# Patient Record
Sex: FEMALE | Race: WHITE | NOT HISPANIC OR LATINO
[De-identification: names, ages, dates, MRNs, and addresses within clinical notes are randomized per-mention and may not be internally consistent; named-entity substitution may affect disease eponyms.]

---

## 2017-01-18 ENCOUNTER — RX RENEWAL (OUTPATIENT)
Age: 82
End: 2017-01-18

## 2017-01-23 ENCOUNTER — APPOINTMENT (OUTPATIENT)
Dept: PULMONOLOGY | Facility: CLINIC | Age: 82
End: 2017-01-23

## 2017-01-23 VITALS
SYSTOLIC BLOOD PRESSURE: 140 MMHG | DIASTOLIC BLOOD PRESSURE: 78 MMHG | WEIGHT: 108 LBS | OXYGEN SATURATION: 98 % | RESPIRATION RATE: 14 BRPM | BODY MASS INDEX: 19.75 KG/M2 | HEART RATE: 80 BPM

## 2017-04-11 ENCOUNTER — RX RENEWAL (OUTPATIENT)
Age: 82
End: 2017-04-11

## 2017-04-17 ENCOUNTER — APPOINTMENT (OUTPATIENT)
Dept: PULMONOLOGY | Facility: CLINIC | Age: 82
End: 2017-04-17

## 2017-04-17 VITALS — WEIGHT: 107 LBS | BODY MASS INDEX: 19.57 KG/M2

## 2017-04-17 VITALS — SYSTOLIC BLOOD PRESSURE: 124 MMHG | OXYGEN SATURATION: 96 % | HEART RATE: 79 BPM | DIASTOLIC BLOOD PRESSURE: 60 MMHG

## 2017-08-03 ENCOUNTER — APPOINTMENT (OUTPATIENT)
Dept: PULMONOLOGY | Facility: CLINIC | Age: 82
End: 2017-08-03
Payer: MEDICARE

## 2017-08-03 VITALS — SYSTOLIC BLOOD PRESSURE: 130 MMHG | BODY MASS INDEX: 19.39 KG/M2 | WEIGHT: 106 LBS | DIASTOLIC BLOOD PRESSURE: 66 MMHG

## 2017-08-03 VITALS — HEART RATE: 70 BPM | OXYGEN SATURATION: 96 %

## 2017-08-03 PROCEDURE — 99215 OFFICE O/P EST HI 40 MIN: CPT

## 2017-12-12 ENCOUNTER — APPOINTMENT (OUTPATIENT)
Dept: PULMONOLOGY | Facility: CLINIC | Age: 82
End: 2017-12-12
Payer: MEDICARE

## 2017-12-12 VITALS
HEART RATE: 79 BPM | WEIGHT: 106 LBS | BODY MASS INDEX: 19.39 KG/M2 | SYSTOLIC BLOOD PRESSURE: 142 MMHG | OXYGEN SATURATION: 95 % | DIASTOLIC BLOOD PRESSURE: 60 MMHG

## 2017-12-12 PROCEDURE — 99214 OFFICE O/P EST MOD 30 MIN: CPT

## 2018-06-12 ENCOUNTER — APPOINTMENT (OUTPATIENT)
Dept: PULMONOLOGY | Facility: CLINIC | Age: 83
End: 2018-06-12
Payer: MEDICARE

## 2018-06-12 VITALS
OXYGEN SATURATION: 95 % | BODY MASS INDEX: 20.12 KG/M2 | DIASTOLIC BLOOD PRESSURE: 68 MMHG | SYSTOLIC BLOOD PRESSURE: 132 MMHG | WEIGHT: 110 LBS | HEART RATE: 73 BPM

## 2018-06-12 PROCEDURE — 94010 BREATHING CAPACITY TEST: CPT

## 2018-06-12 PROCEDURE — 99214 OFFICE O/P EST MOD 30 MIN: CPT | Mod: 25

## 2018-09-05 ENCOUNTER — MEDICATION RENEWAL (OUTPATIENT)
Age: 83
End: 2018-09-05

## 2019-01-14 ENCOUNTER — APPOINTMENT (OUTPATIENT)
Dept: PULMONOLOGY | Facility: CLINIC | Age: 84
End: 2019-01-14
Payer: MEDICARE

## 2019-01-14 VITALS
OXYGEN SATURATION: 96 % | HEART RATE: 77 BPM | DIASTOLIC BLOOD PRESSURE: 60 MMHG | WEIGHT: 106 LBS | SYSTOLIC BLOOD PRESSURE: 126 MMHG | BODY MASS INDEX: 19.39 KG/M2

## 2019-01-14 PROCEDURE — 99214 OFFICE O/P EST MOD 30 MIN: CPT

## 2019-01-14 NOTE — PHYSICAL EXAM
[Normal Appearance] : normal appearance [Normal Conjunctiva] : the conjunctiva exhibited no abnormalities [Normal Oropharynx] : normal oropharynx [Neck Appearance] : the appearance of the neck was normal [Heart Rate And Rhythm] : heart rate and rhythm were normal [Heart Sounds] : normal S1 and S2 [Murmurs] : no murmurs present [Edema] : no peripheral edema present [] : no respiratory distress [Respiration, Rhythm And Depth] : normal respiratory rhythm and effort [Exaggerated Use Of Accessory Muscles For Inspiration] : no accessory muscle use [Auscultation Breath Sounds / Voice Sounds] : lungs were clear to auscultation bilaterally [FreeTextEntry1] : No abnromalities [Abdomen Soft] : soft [Abdomen Tenderness] : non-tender [Abnormal Walk] : normal gait [Nail Clubbing] : no clubbing of the fingernails [Cyanosis, Localized] : no localized cyanosis [Oriented To Time, Place, And Person] : oriented to person, place, and time

## 2019-01-14 NOTE — HISTORY OF PRESENT ILLNESS
[FreeTextEntry1] :   The patient had a h/o a new nodule which was thought to be inflammatory or infectious and for which she had a course of Abx. PET CT was performed but was negative. 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 She underwent VATS and resection 7/2014. She has recovered well without new complaints.\par She has residual nodules which have been stable on her chest CT.\par She has been doing well with Flonase for PNDS [Doing Well] : doing well [Difficulty Breathing During Exertion] : denies dyspnea on exertion [Feelings Of Weakness On Exertion] : denies exercise intolerance [Cough] : stable coughing [Coughing Up Sputum] : denies coughing up sputum [Wheezing] : denies wheezing [Regional Soft Tissue Swelling Both Lower Extremities] : denies lower extremity edema [None] : None [Adherent] : the patient is adherent with ~his/her~ medication regimen [Goals--Doing Well] : the patient is doing well with ~his/her~ COPD goals [CT Scan] : a CT scan [On ___] : performed on [unfilled] [Patient] : the patient [Indication ___] : for an indication of [unfilled] [FreeTextEntry5] : Chest CT [FreeTextEntry8] : Essentially stable nodules except for 3 new or more conspicuous nodules

## 2019-01-14 NOTE — PROCEDURE
[FreeTextEntry1] : Spirometry performed previously and today revealed mild COPD and is at relative baseline.

## 2019-01-14 NOTE — REVIEW OF SYSTEMS
[Fever] : no fever [Chills] : no chills [Dry Eyes] : no dryness of the eyes [Eye Irritation] : no ~T irritation of the eyes [Nasal Congestion] : no nasal congestion [Epistaxis] : no nosebleeds [Postnasal Drip] : postnasal drip [Sinus Problems] : no sinus problems [Cough] : cough [Sputum] : not coughing up ~M sputum [Hemoptysis] : no hemoptysis [Dyspnea] : no dyspnea [Chest Tightness] : no chest tightness [Pleuritic Pain] : no pleuritic pain [Wheezing] : no wheezing [Hypertension] : ~T hypertension [Chest Discomfort] : no chest discomfort [Dysrhythmia] : no dysrhythmia [Murmurs] : no murmurs were heard [Palpitations] : no palpitations [Edema] : ~T edema was not present [Hay Fever] : no hay fever [Itchy Eyes] : no itching of ~T the eyes [Reflux] : no reflux [Nausea] : no nausea [Vomiting] : no vomiting [Constipation] : no constipation [Diarrhea] : no diarrhea [Abdominal Pain] : no abdominal pain [Dysuria] : no dysuria [Trauma] : no ~T physical trauma [Fracture] : no fracture [Back Pain] : ~T no back pain [Anemia] : anemia [Headache] : no headache [Dizziness] : no dizziness [Syncope] : no fainting [Numbness] : no numbness [Paralysis] : no paralysis was seen [Seizures] : no seizures [Depression] : no depression [Anxiety] : no anxiety [Diabetes] : diabetes mellitus [Thyroid Problem] : thyroid problem [Difficulty Initiating Sleep] : no difficulty falling asleep [Difficulty Maintaining Sleep] : no difficulty maintaining sleep [Snoring] : snoring [Witnessed Apneas] : demonstrated no ~M apnea [Nonrestorative Sleep] : restorative sleep

## 2019-01-14 NOTE — DISCUSSION/SUMMARY
[FreeTextEntry1] : \par   #1. The patient's COPD appears to be at relative baseline clinically and will continue her Spiriva and Symbicort\par #2. She will continue her Nasonex and Singulair for her postnasal drip and allergic rhinitis.\par #3. I have also recommended exercise to improve her exercise tolerance.\par #4. Thoracic surgery f/u as needed post resection of nodule c/w adenoCa. \par #5. Oncology f/u for h/o of breast Ca and lung Ca; she is followed by Dr. Redman for anemia\par #6. Continue hematology w/u for chronic anemia as required\par #7. F/u chest CT in 3 months year to re-evaluate nodules given new 5 and 6 mm RUBIA nodules\par #8. F/u in 3 months with PFTs and chest CT

## 2019-01-14 NOTE — REASON FOR VISIT
[Follow-Up] : a follow-up visit [Abnormal CT Scan] : abnormal CT Scan [COPD] : COPD [Lung Cancer] : lung cancer [FreeTextEntry2] : lung nodules

## 2019-01-14 NOTE — CONSULT LETTER
[Dear  ___] : Dear  [unfilled], [Consult Letter:] : I had the pleasure of evaluating your patient, [unfilled]. [Please see my note below.] : Please see my note below. [Consult Closing:] : Thank you very much for allowing me to participate in the care of this patient.  If you have any questions, please do not hesitate to contact me. [Sincerely,] : Sincerely, [DrAlec  ___] : Dr. CONTI [Gustavo Thompson MD] : Gustavo Thompson MD

## 2019-04-17 ENCOUNTER — APPOINTMENT (OUTPATIENT)
Dept: PULMONOLOGY | Facility: CLINIC | Age: 84
End: 2019-04-17
Payer: MEDICARE

## 2019-04-17 VITALS — DIASTOLIC BLOOD PRESSURE: 60 MMHG | HEART RATE: 73 BPM | SYSTOLIC BLOOD PRESSURE: 124 MMHG | OXYGEN SATURATION: 95 %

## 2019-04-17 VITALS — WEIGHT: 109 LBS | BODY MASS INDEX: 19.94 KG/M2

## 2019-04-17 PROCEDURE — 94729 DIFFUSING CAPACITY: CPT

## 2019-04-17 PROCEDURE — 85018 HEMOGLOBIN: CPT | Mod: QW

## 2019-04-17 PROCEDURE — 99214 OFFICE O/P EST MOD 30 MIN: CPT | Mod: 25

## 2019-04-17 PROCEDURE — 94727 GAS DIL/WSHOT DETER LNG VOL: CPT

## 2019-04-17 PROCEDURE — 94010 BREATHING CAPACITY TEST: CPT

## 2019-04-17 NOTE — HISTORY OF PRESENT ILLNESS
[Doing Well] : doing well [Difficulty Breathing During Exertion] : denies dyspnea on exertion [Cough] : stable coughing [Feelings Of Weakness On Exertion] : denies exercise intolerance [Coughing Up Sputum] : denies coughing up sputum [Wheezing] : denies wheezing [Regional Soft Tissue Swelling Both Lower Extremities] : denies lower extremity edema [None] : None [Adherent] : the patient is adherent with ~his/her~ medication regimen [Goals--Doing Well] : the patient is doing well with ~his/her~ COPD goals [CT Scan] : a CT scan [On ___] : performed on [unfilled] [Patient] : the patient [Indication ___] : for an indication of [unfilled] [FreeTextEntry1] :   The patient had a h/o a new nodule which was thought to be inflammatory or infectious and for which she had a course of Abx. PET CT was performed but was negative. 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 She underwent VATS and resection 7/2014. She has recovered well without new complaints.\par She has residual nodules which have been stable on her chest CT.\par She has been doing well with Flonase for PNDS [FreeTextEntry5] : Chest CT [FreeTextEntry8] : Essentially stable nodules except for 3 new or more conspicuous nodules

## 2019-04-17 NOTE — REVIEW OF SYSTEMS
[Postnasal Drip] : postnasal drip [Cough] : cough [Hypertension] : ~T hypertension [Anemia] : anemia [Diabetes] : diabetes mellitus [Thyroid Problem] : thyroid problem [Snoring] : snoring [Fever] : no fever [Chills] : no chills [Dry Eyes] : no dryness of the eyes [Eye Irritation] : no ~T irritation of the eyes [Nasal Congestion] : no nasal congestion [Epistaxis] : no nosebleeds [Sinus Problems] : no sinus problems [Sputum] : not coughing up ~M sputum [Hemoptysis] : no hemoptysis [Dyspnea] : no dyspnea [Chest Tightness] : no chest tightness [Pleuritic Pain] : no pleuritic pain [Wheezing] : no wheezing [Chest Discomfort] : no chest discomfort [Dysrhythmia] : no dysrhythmia [Murmurs] : no murmurs were heard [Palpitations] : no palpitations [Edema] : ~T edema was not present [Hay Fever] : no hay fever [Itchy Eyes] : no itching of ~T the eyes [Reflux] : no reflux [Nausea] : no nausea [Vomiting] : no vomiting [Constipation] : no constipation [Diarrhea] : no diarrhea [Abdominal Pain] : no abdominal pain [Dysuria] : no dysuria [Trauma] : no ~T physical trauma [Fracture] : no fracture [Back Pain] : ~T no back pain [Headache] : no headache [Dizziness] : no dizziness [Syncope] : no fainting [Numbness] : no numbness [Paralysis] : no paralysis was seen [Seizures] : no seizures [Depression] : no depression [Anxiety] : no anxiety [Difficulty Initiating Sleep] : no difficulty falling asleep [Difficulty Maintaining Sleep] : no difficulty maintaining sleep [Witnessed Apneas] : demonstrated no ~M apnea [Nonrestorative Sleep] : restorative sleep

## 2019-04-17 NOTE — PHYSICAL EXAM
[Normal Appearance] : normal appearance [Normal Conjunctiva] : the conjunctiva exhibited no abnormalities [Normal Oropharynx] : normal oropharynx [Neck Appearance] : the appearance of the neck was normal [Heart Rate And Rhythm] : heart rate and rhythm were normal [Murmurs] : no murmurs present [Heart Sounds] : normal S1 and S2 [Edema] : no peripheral edema present [] : no respiratory distress [Respiration, Rhythm And Depth] : normal respiratory rhythm and effort [Exaggerated Use Of Accessory Muscles For Inspiration] : no accessory muscle use [Auscultation Breath Sounds / Voice Sounds] : lungs were clear to auscultation bilaterally [Abdomen Soft] : soft [Abdomen Tenderness] : non-tender [Abnormal Walk] : normal gait [Nail Clubbing] : no clubbing of the fingernails [Cyanosis, Localized] : no localized cyanosis [No Focal Deficits] : no focal deficits [Oriented To Time, Place, And Person] : oriented to person, place, and time [FreeTextEntry1] : No abnromalities

## 2019-04-17 NOTE — DISCUSSION/SUMMARY
[FreeTextEntry1] : \par   #1. The patient's COPD appears to be at relative baseline clinically and will continue her Spiriva and Symbicort\par #2. She will continue her Nasonex and Singulair for her postnasal drip and allergic rhinitis.\par #3. I have also recommended exercise to improve her exercise tolerance.\par #4. Thoracic surgery f/u as needed post resection of nodule c/w adenoCa. \par #5. Oncology f/u for h/o of breast Ca and lung Ca; she is followed by Dr. Redman for anemia\par #6. Continue hematology w/u for chronic anemia as required\par #7. F/u chest CT in 6 months year to re-evaluate nodules given new 3 mm RLL nodule\par #8. F/u in 6 months with repeat chest CT

## 2019-11-18 ENCOUNTER — APPOINTMENT (OUTPATIENT)
Dept: PULMONOLOGY | Facility: CLINIC | Age: 84
End: 2019-11-18
Payer: MEDICARE

## 2019-11-18 VITALS
SYSTOLIC BLOOD PRESSURE: 130 MMHG | OXYGEN SATURATION: 96 % | HEIGHT: 62 IN | DIASTOLIC BLOOD PRESSURE: 60 MMHG | WEIGHT: 108 LBS | BODY MASS INDEX: 19.88 KG/M2 | HEART RATE: 84 BPM

## 2019-11-18 PROCEDURE — 99214 OFFICE O/P EST MOD 30 MIN: CPT

## 2019-11-18 NOTE — CONSULT LETTER
[Dear  ___] : Dear  [unfilled], [Consult Letter:] : I had the pleasure of evaluating your patient, [unfilled]. [Please see my note below.] : Please see my note below. [Consult Closing:] : Thank you very much for allowing me to participate in the care of this patient.  If you have any questions, please do not hesitate to contact me. [Sincerely,] : Sincerely, [DrAlec  ___] : Dr. CONTI [Gustavo Thompson MD] : Gustavo Thompson MD [FreeTextEntry3] : Gustavo Thompson MD, FCCP, D. ABSM\par Pulmonary and Sleep Medicine\par [facility] at [place]

## 2019-11-18 NOTE — PHYSICAL EXAM
[Normal Appearance] : normal appearance [Normal Oropharynx] : normal oropharynx [Normal Conjunctiva] : the conjunctiva exhibited no abnormalities [Neck Appearance] : the appearance of the neck was normal [Heart Rate And Rhythm] : heart rate and rhythm were normal [Murmurs] : no murmurs present [Heart Sounds] : normal S1 and S2 [] : no respiratory distress [Edema] : no peripheral edema present [Respiration, Rhythm And Depth] : normal respiratory rhythm and effort [Exaggerated Use Of Accessory Muscles For Inspiration] : no accessory muscle use [Auscultation Breath Sounds / Voice Sounds] : lungs were clear to auscultation bilaterally [Abdomen Tenderness] : non-tender [Abdomen Soft] : soft [Nail Clubbing] : no clubbing of the fingernails [Abnormal Walk] : normal gait [Cyanosis, Localized] : no localized cyanosis [No Focal Deficits] : no focal deficits [Oriented To Time, Place, And Person] : oriented to person, place, and time [FreeTextEntry1] : No abnromalities

## 2019-11-18 NOTE — PROCEDURE
[FreeTextEntry1] : PFTs 4/17/19 - Mild restriction without significant change from her previous; lung volumes were essentially normal with a mildly reduced diffusion capacity; essentially unchanged with perhaps mild deterioration over the past 3-4 years.

## 2019-11-18 NOTE — REASON FOR VISIT
[Follow-Up] : a follow-up visit [Abnormal CT Scan] : abnormal CT Scan [Lung Cancer] : lung cancer [COPD] : COPD [FreeTextEntry2] : lung nodules

## 2019-11-18 NOTE — HISTORY OF PRESENT ILLNESS
[Difficulty Breathing During Exertion] : denies dyspnea on exertion [Feelings Of Weakness On Exertion] : denies exercise intolerance [Doing Well] : doing well [Wheezing] : denies wheezing [Cough] : stable coughing [Coughing Up Sputum] : denies coughing up sputum [Adherent] : the patient is adherent with ~his/her~ medication regimen [Regional Soft Tissue Swelling Both Lower Extremities] : denies lower extremity edema [None] : None [Goals--Doing Well] : the patient is doing well with ~his/her~ COPD goals [CT Scan] : a CT scan [On ___] : performed on [unfilled] [Indication ___] : for an indication of [unfilled] [Patient] : the patient [FreeTextEntry1] :   The patient had a h/o a new nodule which was thought to be inflammatory or infectious and for which she had a course of Abx. PET CT was performed but was negative. 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 She underwent VATS and resection 7/2014. She has recovered well without new complaints.\par She has residual nodules which have been stable on her chest CT.\par She has been doing well with Flonase for PNDS [FreeTextEntry5] : Chest CT [FreeTextEntry8] : Essentially stable nodules except for 3 new or more conspicuous nodules

## 2019-11-18 NOTE — REVIEW OF SYSTEMS
[Postnasal Drip] : postnasal drip [Cough] : cough [Hypertension] : ~T hypertension [Anemia] : anemia [Diabetes] : diabetes mellitus [Thyroid Problem] : thyroid problem [Snoring] : snoring [Fever] : no fever [Chills] : no chills [Dry Eyes] : no dryness of the eyes [Eye Irritation] : no ~T irritation of the eyes [Nasal Congestion] : no nasal congestion [Epistaxis] : no nosebleeds [Sinus Problems] : no sinus problems [Sputum] : not coughing up ~M sputum [Hemoptysis] : no hemoptysis [Dyspnea] : no dyspnea [Pleuritic Pain] : no pleuritic pain [Chest Tightness] : no chest tightness [Wheezing] : no wheezing [Chest Discomfort] : no chest discomfort [Dysrhythmia] : no dysrhythmia [Murmurs] : no murmurs were heard [Hay Fever] : no hay fever [Palpitations] : no palpitations [Edema] : ~T edema was not present [Itchy Eyes] : no itching of ~T the eyes [Reflux] : no reflux [Nausea] : no nausea [Vomiting] : no vomiting [Constipation] : no constipation [Diarrhea] : no diarrhea [Abdominal Pain] : no abdominal pain [Dysuria] : no dysuria [Trauma] : no ~T physical trauma [Fracture] : no fracture [Back Pain] : ~T no back pain [Headache] : no headache [Dizziness] : no dizziness [Syncope] : no fainting [Numbness] : no numbness [Paralysis] : no paralysis was seen [Seizures] : no seizures [Depression] : no depression [Anxiety] : no anxiety [Difficulty Initiating Sleep] : no difficulty falling asleep [Difficulty Maintaining Sleep] : no difficulty maintaining sleep [Nonrestorative Sleep] : restorative sleep [Witnessed Apneas] : demonstrated no ~M apnea

## 2019-12-03 ENCOUNTER — RX RENEWAL (OUTPATIENT)
Age: 84
End: 2019-12-03

## 2020-07-01 ENCOUNTER — APPOINTMENT (OUTPATIENT)
Dept: PULMONOLOGY | Facility: CLINIC | Age: 85
End: 2020-07-01
Payer: MEDICARE

## 2020-07-01 VITALS
OXYGEN SATURATION: 95 % | WEIGHT: 105 LBS | SYSTOLIC BLOOD PRESSURE: 132 MMHG | BODY MASS INDEX: 19.83 KG/M2 | HEIGHT: 61 IN | DIASTOLIC BLOOD PRESSURE: 76 MMHG | HEART RATE: 74 BPM

## 2020-07-01 PROCEDURE — 99214 OFFICE O/P EST MOD 30 MIN: CPT

## 2020-07-01 NOTE — DISCUSSION/SUMMARY
[FreeTextEntry1] : \par   #1. The patient's COPD appears to be at relative baseline clinically and will continue her Spiriva and Symbicort\par #2. She will continue her Nasonex and Singulair for her postnasal drip and allergic rhinitis.\par #3. I have also recommended exercise to improve her exercise tolerance.\par #4. Thoracic surgery f/u as needed post resection of nodule c/w adenoCa. \par #5. Oncology f/u for h/o of breast Ca and lung Ca; she is followed by Dr. Redman for anemia\par #6. Continue hematology w/u for chronic anemia as required\par #7. F/u chest CT in 11/2020 for one year f/u to re-evaluate nodules including stable new 3 mm RLL nodule\par #8. F/u in 5-6 months with repeat PFTs\par #9. Reviewed risks of exposure and symptoms of Covid-19 virus, including how the virus is spread.\par \par Discussed the following risk-reducing strategies:\par -Wash hands with soap and water (proper technique reviewed) \par -Use alcohol based  when hand-washing is not an option\par -Maintain a social distance of at least 6 feet whenever possible\par -Avoid contact, especially hand shaking\par -Avoid touching eyes, nose, and mouth\par -Cover face/mouth when coughing or sneezing\par -Avoid close contact with sick people\par -Seek medical help if you develop a fever and/or respiratory symptoms (e.g. cough, chest pain, SOB)\par \par Patient's questions were answered and patient appears to understand these recommendations

## 2020-07-01 NOTE — REVIEW OF SYSTEMS
[Postnasal Drip] : postnasal drip [Cough] : cough [Hypertension] : ~T hypertension [Anemia] : anemia [Diabetes] : diabetes mellitus [Thyroid Problem] : thyroid problem [Snoring] : snoring [Fever] : no fever [Chills] : no chills [Dry Eyes] : no dryness of the eyes [Eye Irritation] : no ~T irritation of the eyes [Nasal Congestion] : no nasal congestion [Sinus Problems] : no sinus problems [Epistaxis] : no nosebleeds [Sputum] : not coughing up ~M sputum [Dyspnea] : no dyspnea [Hemoptysis] : no hemoptysis [Chest Tightness] : no chest tightness [Pleuritic Pain] : no pleuritic pain [Wheezing] : no wheezing [Dysrhythmia] : no dysrhythmia [Chest Discomfort] : no chest discomfort [Murmurs] : no murmurs were heard [Hay Fever] : no hay fever [Palpitations] : no palpitations [Edema] : ~T edema was not present [Itchy Eyes] : no itching of ~T the eyes [Reflux] : no reflux [Nausea] : no nausea [Vomiting] : no vomiting [Constipation] : no constipation [Abdominal Pain] : no abdominal pain [Dysuria] : no dysuria [Diarrhea] : no diarrhea [Fracture] : no fracture [Back Pain] : ~T no back pain [Trauma] : no ~T physical trauma [Syncope] : no fainting [Headache] : no headache [Dizziness] : no dizziness [Numbness] : no numbness [Seizures] : no seizures [Paralysis] : no paralysis was seen [Difficulty Initiating Sleep] : no difficulty falling asleep [Depression] : no depression [Anxiety] : no anxiety [Difficulty Maintaining Sleep] : no difficulty maintaining sleep [Witnessed Apneas] : demonstrated no ~M apnea [Nonrestorative Sleep] : restorative sleep

## 2020-07-01 NOTE — CONSULT LETTER
[Dear  ___] : Dear  [unfilled], [Please see my note below.] : Please see my note below. [Consult Letter:] : I had the pleasure of evaluating your patient, [unfilled]. [Consult Closing:] : Thank you very much for allowing me to participate in the care of this patient.  If you have any questions, please do not hesitate to contact me. [Sincerely,] : Sincerely, [DrAlec  ___] : Dr. CONTI [Gustavo Thompson MD] : Gustavo Thompson MD [FreeTextEntry3] : Gustavo Thompson MD, FCCP, D. ABSM\par Pulmonary and Sleep Medicine\par [facility] at [place]

## 2020-07-01 NOTE — HISTORY OF PRESENT ILLNESS
[Doing Well] : doing well [Feelings Of Weakness On Exertion] : denies exercise intolerance [Difficulty Breathing During Exertion] : denies dyspnea on exertion [Cough] : stable coughing [Wheezing] : denies wheezing [Coughing Up Sputum] : denies coughing up sputum [Regional Soft Tissue Swelling Both Lower Extremities] : denies lower extremity edema [Adherent] : the patient is adherent with ~his/her~ medication regimen [None] : None [Goals--Doing Well] : the patient is doing well with ~his/her~ COPD goals [CT Scan] : a CT scan [On ___] : performed on [unfilled] [Patient] : the patient [Indication ___] : for an indication of [unfilled] [FreeTextEntry5] : Chest CT [FreeTextEntry1] :   The patient had a h/o a new nodule which was thought to be inflammatory or infectious and for which she had a course of Abx. PET CT was performed but was negative. 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 She underwent VATS and resection 7/2014. She has recovered well without new complaints.\par She has residual nodules which have been stable on her chest CT.\par She has been doing well with Flonase for PNDS [FreeTextEntry8] : Essentially stable nodules except for 3 new or more conspicuous nodules

## 2020-07-01 NOTE — PHYSICAL EXAM
[Normal Appearance] : normal appearance [Normal Conjunctiva] : the conjunctiva exhibited no abnormalities [Normal Oropharynx] : normal oropharynx [Neck Appearance] : the appearance of the neck was normal [Heart Rate And Rhythm] : heart rate and rhythm were normal [Heart Sounds] : normal S1 and S2 [Edema] : no peripheral edema present [Murmurs] : no murmurs present [] : no respiratory distress [Respiration, Rhythm And Depth] : normal respiratory rhythm and effort [Exaggerated Use Of Accessory Muscles For Inspiration] : no accessory muscle use [Auscultation Breath Sounds / Voice Sounds] : lungs were clear to auscultation bilaterally [Abdomen Soft] : soft [Abdomen Tenderness] : non-tender [Abnormal Walk] : normal gait [Nail Clubbing] : no clubbing of the fingernails [Cyanosis, Localized] : no localized cyanosis [No Focal Deficits] : no focal deficits [Oriented To Time, Place, And Person] : oriented to person, place, and time [FreeTextEntry1] : No abnromalities

## 2020-12-13 DIAGNOSIS — Z01.818 ENCOUNTER FOR OTHER PREPROCEDURAL EXAMINATION: ICD-10-CM

## 2020-12-14 ENCOUNTER — APPOINTMENT (OUTPATIENT)
Dept: DISASTER EMERGENCY | Facility: CLINIC | Age: 85
End: 2020-12-14

## 2020-12-15 LAB — SARS-COV-2 N GENE NPH QL NAA+PROBE: NOT DETECTED

## 2021-01-15 ENCOUNTER — APPOINTMENT (OUTPATIENT)
Dept: DISASTER EMERGENCY | Facility: CLINIC | Age: 86
End: 2021-01-15

## 2021-01-16 ENCOUNTER — TRANSCRIPTION ENCOUNTER (OUTPATIENT)
Age: 86
End: 2021-01-16

## 2021-01-17 LAB — SARS-COV-2 N GENE NPH QL NAA+PROBE: NOT DETECTED

## 2021-02-19 ENCOUNTER — APPOINTMENT (OUTPATIENT)
Dept: DISASTER EMERGENCY | Facility: CLINIC | Age: 86
End: 2021-02-19

## 2021-02-20 LAB — SARS-COV-2 N GENE NPH QL NAA+PROBE: NOT DETECTED

## 2021-02-22 ENCOUNTER — APPOINTMENT (OUTPATIENT)
Dept: PULMONOLOGY | Facility: CLINIC | Age: 86
End: 2021-02-22
Payer: MEDICARE

## 2021-02-22 ENCOUNTER — APPOINTMENT (OUTPATIENT)
Dept: PULMONOLOGY | Facility: CLINIC | Age: 86
End: 2021-02-22

## 2021-02-22 VITALS — TEMPERATURE: 98 F | BODY MASS INDEX: 20.2 KG/M2 | WEIGHT: 107 LBS | HEIGHT: 61 IN

## 2021-02-22 VITALS — OXYGEN SATURATION: 95 % | HEART RATE: 66 BPM | RESPIRATION RATE: 16 BRPM

## 2021-02-22 VITALS — DIASTOLIC BLOOD PRESSURE: 60 MMHG | SYSTOLIC BLOOD PRESSURE: 120 MMHG

## 2021-02-22 PROCEDURE — 94729 DIFFUSING CAPACITY: CPT

## 2021-02-22 PROCEDURE — 85018 HEMOGLOBIN: CPT | Mod: QW

## 2021-02-22 PROCEDURE — 94727 GAS DIL/WSHOT DETER LNG VOL: CPT

## 2021-02-22 PROCEDURE — 94010 BREATHING CAPACITY TEST: CPT

## 2021-02-22 PROCEDURE — 99214 OFFICE O/P EST MOD 30 MIN: CPT | Mod: 25

## 2021-02-22 RX ORDER — ATORVASTATIN CALCIUM 20 MG/1
20 TABLET, FILM COATED ORAL
Qty: 90 | Refills: 0 | Status: ACTIVE | COMMUNITY
Start: 2020-07-16

## 2021-02-22 RX ORDER — FAMOTIDINE 20 MG/1
20 TABLET, FILM COATED ORAL
Qty: 180 | Refills: 0 | Status: ACTIVE | COMMUNITY
Start: 2020-10-12

## 2021-02-22 RX ORDER — METFORMIN HYDROCHLORIDE 500 MG/1
500 TABLET, COATED ORAL
Qty: 180 | Refills: 0 | Status: ACTIVE | COMMUNITY
Start: 2021-02-16

## 2021-02-22 NOTE — DISCUSSION/SUMMARY
[FreeTextEntry1] : \par   #1. The patient's COPD appears to be at relative baseline clinically and will continue her Spiriva daily\par #2. She will continue her Nasonex and Singulair for her postnasal drip and allergic rhinitis.\par #3. I have also recommended exercise to improve her exercise tolerance.\par #4. Thoracic surgery f/u as needed post resection of nodule c/w adenoCa. \par #5. Oncology f/u for h/o of breast Ca and lung Ca; she is followed by Dr. Redman for anemia\par #6. Continue hematology w/u for chronic anemia as required\par #7. F/u chest CT revealed stable nodules; could consider repeat in one year if desired by pt\par #8. F/u in 6 months \par #9. Reviewed risks of exposure and symptoms of Covid-19 virus, including how the virus is spread and precautions to avoid heladio virus.\par \par Patient's questions were answered and patient appears to understand these recommendations

## 2021-02-22 NOTE — PHYSICAL EXAM
[Normal Appearance] : normal appearance [Normal Conjunctiva] : the conjunctiva exhibited no abnormalities [Normal Oropharynx] : normal oropharynx [Neck Appearance] : the appearance of the neck was normal [Heart Rate And Rhythm] : heart rate and rhythm were normal [Heart Sounds] : normal S1 and S2 [Murmurs] : no murmurs present [Edema] : no peripheral edema present [] : no respiratory distress [Respiration, Rhythm And Depth] : normal respiratory rhythm and effort [Exaggerated Use Of Accessory Muscles For Inspiration] : no accessory muscle use [Auscultation Breath Sounds / Voice Sounds] : lungs were clear to auscultation bilaterally [Abdomen Soft] : soft [Abdomen Tenderness] : non-tender [Abnormal Walk] : normal gait [Nail Clubbing] : no clubbing of the fingernails [Cyanosis, Localized] : no localized cyanosis [No Focal Deficits] : no focal deficits [Oriented To Time, Place, And Person] : oriented to person, place, and time [FreeTextEntry1] : No abnromalities

## 2021-02-22 NOTE — CONSULT LETTER
[Dear  ___] : Dear  [unfilled], [Consult Letter:] : I had the pleasure of evaluating your patient, [unfilled]. [Please see my note below.] : Please see my note below. [Consult Closing:] : Thank you very much for allowing me to participate in the care of this patient.  If you have any questions, please do not hesitate to contact me. [Sincerely,] : Sincerely, [DrAlec  ___] : Dr. CONTI [Gustavo Thompson MD] : Gustavo Thompson MD [FreeTextEntry3] : Gustavo Thompson MD, FCCP, D. ABSM\par Pulmonary and Sleep Medicine\par [facility] at [place]

## 2021-02-22 NOTE — HISTORY OF PRESENT ILLNESS
[Doing Well] : doing well [Difficulty Breathing During Exertion] : denies dyspnea on exertion [Feelings Of Weakness On Exertion] : denies exercise intolerance [Cough] : stable coughing [Coughing Up Sputum] : denies coughing up sputum [Wheezing] : denies wheezing [Regional Soft Tissue Swelling Both Lower Extremities] : denies lower extremity edema [None] : None [Adherent] : the patient is adherent with ~his/her~ medication regimen [Goals--Doing Well] : the patient is doing well with ~his/her~ COPD goals [CT Scan] : a CT scan [On ___] : performed on [unfilled] [Patient] : the patient [Indication ___] : for an indication of [unfilled] [FreeTextEntry1] :   The patient had a h/o a new nodule which was thought to be inflammatory or infectious and for which she had a course of Abx. PET CT was performed but was negative. 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 She underwent VATS and resection 7/2014. She has recovered well without new complaints.\par She has residual nodules which have been stable on her chest CT.\par She has been doing well with Flonase for PNDS [FreeTextEntry5] : Chest CT [FreeTextEntry8] : Essentially stable nodules except for 3 new or more conspicuous nodules

## 2021-02-22 NOTE — PROCEDURE
[FreeTextEntry1] : PFTs 4/17/19 - Mild restriction without significant change from her previous; lung volumes were essentially normal with a mildly reduced diffusion capacity; essentially unchanged with perhaps mild deterioration over the past 3-4 years.\par PFTs 2/22/21 - Mild obstruction on spirometry with normal lung volumes and near normal diffusion capacity; overall, at baseline

## 2021-07-08 ENCOUNTER — RX RENEWAL (OUTPATIENT)
Age: 86
End: 2021-07-08

## 2021-08-17 ENCOUNTER — APPOINTMENT (OUTPATIENT)
Dept: PULMONOLOGY | Facility: CLINIC | Age: 86
End: 2021-08-17
Payer: MEDICARE

## 2021-08-17 VITALS
HEART RATE: 66 BPM | BODY MASS INDEX: 19.84 KG/M2 | DIASTOLIC BLOOD PRESSURE: 62 MMHG | OXYGEN SATURATION: 95 % | WEIGHT: 105 LBS | SYSTOLIC BLOOD PRESSURE: 116 MMHG

## 2021-08-17 PROCEDURE — 99214 OFFICE O/P EST MOD 30 MIN: CPT

## 2021-08-17 RX ORDER — METFORMIN ER 500 MG 500 MG/1
500 TABLET ORAL
Qty: 90 | Refills: 0 | Status: DISCONTINUED | COMMUNITY
Start: 2020-10-13 | End: 2021-08-17

## 2021-08-17 NOTE — CONSULT LETTER
[Dear  ___] : Dear  [unfilled], [Consult Letter:] : I had the pleasure of evaluating your patient, [unfilled]. [Please see my note below.] : Please see my note below. [Consult Closing:] : Thank you very much for allowing me to participate in the care of this patient.  If you have any questions, please do not hesitate to contact me. [Sincerely,] : Sincerely, [DrAlec  ___] : Dr. CONTI [FreeTextEntry3] : Gustavo Thompson MD, FCCP, D. ABSM\par Pulmonary and Sleep Medicine\par [facility] at [place]

## 2021-08-17 NOTE — DISCUSSION/SUMMARY
[FreeTextEntry1] : \par   #1. The patient's COPD appears to be at relative baseline clinically and will continue her Spiriva daily\par #2. She will continue her Nasonex and for her postnasal drip. She is no longer on Montelukast\par #3. I have also recommended exercise to improve her exercise tolerance.\par #4. Thoracic surgery f/u as needed post resection of nodule c/w adenoCa. \par #5. Oncology f/u for h/o of breast Ca and lung Ca; she is followed by Dr. Redman for anemia\par #6. Continue hematology w/u for chronic anemia as required\par #7. F/u chest CT revealed stable nodules; could consider repeat in one year if desired by pt\par #8. F/u in 6 months with CT and PFTs\par #9. Pt had both Covid vaccines \par #10. Reviewed risks of exposure and symptoms of Covid-19 virus, including how the virus is spread and precautions to avoid heladio virus.\par \par Patient's questions were answered and patient appears to understand these recommendations

## 2021-08-17 NOTE — PHYSICAL EXAM
[Normal Appearance] : normal appearance [Normal Conjunctiva] : the conjunctiva exhibited no abnormalities [Normal Oropharynx] : normal oropharynx [Neck Appearance] : the appearance of the neck was normal [Heart Rate And Rhythm] : heart rate and rhythm were normal [Heart Sounds] : normal S1 and S2 [Murmurs] : no murmurs present [Edema] : no peripheral edema present [] : no respiratory distress [Respiration, Rhythm And Depth] : normal respiratory rhythm and effort [Exaggerated Use Of Accessory Muscles For Inspiration] : no accessory muscle use [Auscultation Breath Sounds / Voice Sounds] : lungs were clear to auscultation bilaterally [Abdomen Soft] : soft [Abdomen Tenderness] : non-tender [Abnormal Walk] : normal gait [Cyanosis, Localized] : no localized cyanosis [Nail Clubbing] : no clubbing of the fingernails [No Focal Deficits] : no focal deficits [Oriented To Time, Place, And Person] : oriented to person, place, and time [FreeTextEntry1] : No abnromalities

## 2021-08-17 NOTE — REASON FOR VISIT
[Follow-Up] : a follow-up visit [Abnormal CXR/ Chest CT] : an abnormal CXR/ chest CT [Lung Cancer] : lung cancer [COPD] : COPD [Shortness of Breath] : shortness of breath [Pulmonary Nodules] : pulmonary nodules

## 2021-11-05 ENCOUNTER — APPOINTMENT (OUTPATIENT)
Dept: NEUROSURGERY | Facility: CLINIC | Age: 86
End: 2021-11-05
Payer: MEDICARE

## 2021-11-05 VITALS
WEIGHT: 102 LBS | DIASTOLIC BLOOD PRESSURE: 73 MMHG | OXYGEN SATURATION: 98 % | SYSTOLIC BLOOD PRESSURE: 168 MMHG | HEIGHT: 61 IN | HEART RATE: 75 BPM | TEMPERATURE: 97.9 F | BODY MASS INDEX: 19.26 KG/M2

## 2021-11-05 DIAGNOSIS — S06.5X9A TRAUMATIC SUBDURAL HEMORRHAGE WITH LOSS OF CONSCIOUSNESS OF UNSPECIFIED DURATION, INITIAL ENCOUNTER: ICD-10-CM

## 2021-11-05 PROCEDURE — 99203 OFFICE O/P NEW LOW 30 MIN: CPT

## 2021-11-05 NOTE — ASSESSMENT
[FreeTextEntry1] : 85 yo suffered a mechanical fall [place] the airport. She was taken to [facility] where she was found to have an anterior falx SDH and multiple facial lacerations, left eyebrow hematoma. She was kept overnight for observation and follows up today. \par Denies headaches and other neurological symptoms.\par Plan: repeat head ct\par Stay off ASA

## 2021-11-05 NOTE — HISTORY OF PRESENT ILLNESS
[FreeTextEntry1] : Anterior falx SDH, s/p mechanical fall [de-identified] : Ms. Raina Soto is a pleasant 87 yo right handed female with PMH of COPD, DM, and HTN presents to the office for a neurosurgical consultation for a SDH s/p mechanical fall. On October 25th, 2021 she suffered a mechanical fall while walking out of the airplane at airport in [place]. She was arriving home from a trip and wedding in [place]. She states her sneaker stuck on floor which caused her to fall forward onto her face. She did not lose consciousness. She was taken to [facility] and stayed overnight for observation. She complained of facial pain and suffered multiple bruising and lacerations to entire face, large hematoma over the left eyebrow, laceration bridge of nose with fracture, upper lip lac and lost her whole top bridge. She maintained alertness the entire time. Non contrast brain ct done showed 4 mm anterior falx sdh ( report provided, no images for review). CT c spine negative. She was prescribed Keppra for seven days which is completed. \par She was taking baby ASA at the time of fall, and has been off it. She was discharged to home the following day on Oct 26th, 2021 with no needs. \par Today, she presents with her  awake, alert and responsive. She only complains of soreness on face where the obvious hematoma and lacerations are. She is finding it difficult to eat since missing top bridge. She has been tolerating soft diet. She denies headaches, dizziness, vision or speech disturbance, senori/motor disturbance. \par Neuro exam intact. \par All questions and concerns addressed. \par \par Plan: Non contrast brain ct to be done next week with fu.\par Stay off ASA for time being\par PCP [facility] at [place]\par [address]\[place] [address]\par \par fax

## 2021-11-05 NOTE — PHYSICAL EXAM
[General Appearance - Alert] : alert [General Appearance - In No Acute Distress] : in no acute distress [Oriented To Time, Place, And Person] : oriented to person, place, and time [Impaired Insight] : insight and judgment were intact [Affect] : the affect was normal [Person] : oriented to person [Place] : oriented to place [Time] : oriented to time [Short Term Intact] : short term memory intact [Remote Intact] : remote memory intact [Span Intact] : the attention span was normal [Fluency] : fluency intact [Concentration Intact] : normal concentrating ability [Comprehension] : comprehension intact [Current Events] : adequate knowledge of current events [Past History] : adequate knowledge of personal past history [Vocabulary] : adequate range of vocabulary [Cranial Nerves Oculomotor (III)] : extraocular motion intact [Cranial Nerves Trigeminal (V)] : facial sensation intact symmetrically [Cranial Nerves Facial (VII)] : face symmetrical [Cranial Nerves Vestibulocochlear (VIII)] : hearing was intact bilaterally [Cranial Nerves Glossopharyngeal (IX)] : tongue and palate midline [Cranial Nerves Accessory (XI - Cranial And Spinal)] : head turning and shoulder shrug symmetric [Cranial Nerves Hypoglossal (XII)] : there was no tongue deviation with protrusion [Motor Tone] : muscle tone was normal in all four extremities [Motor Strength] : muscle strength was normal in all four extremities [No Muscle Atrophy] : normal bulk in all four extremities [Motor Handedness Right-Handed] : the patient is right hand dominant [Sensation Tactile Decrease] : light touch was intact [Balance] : balance was intact [Limited Balance] : balance was intact [Past-pointing] : there was no past-pointing [Coordination - Dysmetria Impaired Finger-to-Nose Bilateral] : not present [Tremor] : no tremor present [FreeTextEntry6] : No drift [No Visual Abnormalities] : no visible abnormalities [Extraocular Movements] : extraocular movements were intact [Outer Ear] : the ears and nose were normal in appearance [Hearing Threshold Finger Rub Not [place]] : hearing was normal [Neck Appearance] : the appearance of the neck was normal [] : no respiratory distress [Respiration, Rhythm And Depth] : normal respiratory rhythm and effort [Exaggerated Use Of Accessory Muscles For Inspiration] : no accessory muscle use [Heart Rate And Rhythm] : heart rate was normal and rhythm regular [Abnormal Walk] : normal gait [FreeTextEntry1] : as in HPI [Involuntary Movements] : no involuntary movements were seen

## 2021-11-05 NOTE — REASON FOR VISIT
[New Patient Visit] : a new patient visit [Referred By: _________] : Patient was referred by GALLITO [Spouse] : spouse

## 2021-11-16 ENCOUNTER — APPOINTMENT (OUTPATIENT)
Dept: CT IMAGING | Facility: CLINIC | Age: 86
End: 2021-11-16
Payer: MEDICARE

## 2021-11-16 ENCOUNTER — OUTPATIENT (OUTPATIENT)
Dept: OUTPATIENT SERVICES | Facility: HOSPITAL | Age: 86
LOS: 1 days | End: 2021-11-16
Payer: MEDICARE

## 2021-11-16 DIAGNOSIS — S06.5X9A TRAUMATIC SUBDURAL HEMORRHAGE WITH LOSS OF CONSCIOUSNESS OF UNSPECIFIED DURATION, INITIAL ENCOUNTER: ICD-10-CM

## 2021-11-16 PROCEDURE — G1004: CPT

## 2021-11-16 PROCEDURE — 70450 CT HEAD/BRAIN W/O DYE: CPT | Mod: ME

## 2021-11-16 PROCEDURE — 70450 CT HEAD/BRAIN W/O DYE: CPT | Mod: 26,ME

## 2021-12-09 ENCOUNTER — NON-APPOINTMENT (OUTPATIENT)
Age: 86
End: 2021-12-09

## 2022-02-11 ENCOUNTER — APPOINTMENT (OUTPATIENT)
Dept: DISASTER EMERGENCY | Facility: CLINIC | Age: 87
End: 2022-02-11

## 2022-02-15 ENCOUNTER — APPOINTMENT (OUTPATIENT)
Dept: PULMONOLOGY | Facility: CLINIC | Age: 87
End: 2022-02-15
Payer: MEDICARE

## 2022-02-15 VITALS
OXYGEN SATURATION: 97 % | RESPIRATION RATE: 16 BRPM | DIASTOLIC BLOOD PRESSURE: 80 MMHG | HEART RATE: 73 BPM | SYSTOLIC BLOOD PRESSURE: 150 MMHG

## 2022-02-15 VITALS — WEIGHT: 104 LBS | HEIGHT: 62 IN | BODY MASS INDEX: 19.14 KG/M2

## 2022-02-15 DIAGNOSIS — Z23 ENCOUNTER FOR IMMUNIZATION: ICD-10-CM

## 2022-02-15 PROCEDURE — 85018 HEMOGLOBIN: CPT | Mod: QW

## 2022-02-15 PROCEDURE — 94010 BREATHING CAPACITY TEST: CPT

## 2022-02-15 PROCEDURE — 94729 DIFFUSING CAPACITY: CPT

## 2022-02-15 PROCEDURE — 99214 OFFICE O/P EST MOD 30 MIN: CPT | Mod: 25

## 2022-02-15 PROCEDURE — 94727 GAS DIL/WSHOT DETER LNG VOL: CPT

## 2022-02-15 NOTE — PROCEDURE
[FreeTextEntry1] : PFTs 4/17/19 - Mild restriction without significant change from her previous; lung volumes were essentially normal with a mildly reduced diffusion capacity; essentially unchanged with perhaps mild deterioration over the past 3-4 years.\par PFTs 2/22/21 - Mild obstruction on spirometry with normal lung volumes and near normal diffusion capacity; overall, at baseline\par PFTs 2/15/22 - Mildly reduced FVC and moderately reduced FEV1 without an obstructive pattern with mildly reduced lung volumes and diffusion capacity; slightly worse than previous

## 2022-02-15 NOTE — CONSULT LETTER
[Dear  ___] : Dear  [unfilled], [Consult Letter:] : I had the pleasure of evaluating your patient, [unfilled]. [Please see my note below.] : Please see my note below. [Consult Closing:] : Thank you very much for allowing me to participate in the care of this patient.  If you have any questions, please do not hesitate to contact me. [Sincerely,] : Sincerely, [DrAlec  ___] : Dr. CONTI [FreeTextEntry3] : Gustavo Thompson MD, FCCP, D. ABSM\par Pulmonary and Sleep Medicine\par [facility] at [place]

## 2022-02-15 NOTE — DISCUSSION/SUMMARY
[FreeTextEntry1] : \par #1. The patient's COPD appears to be at relative baseline clinically and will continue her Spiriva daily; consider additional therapy with Anoro or Breo if lung function worsens\par #2. She will continue her Nasonex and for her postnasal drip. She is no longer on Montelukast\par #3. I have also recommended exercise to improve her exercise tolerance.\par #4. Thoracic surgery f/u as needed post resection of nodule c/w adenoCa. \par #5. Oncology f/u for h/o of breast Ca and lung Ca; she is followed by Dr. Redman for anemia\par #6. Continue hematology w/u for chronic anemia as required\par #7. F/u chest CT revealed stable nodules; could consider repeat in one year if desired by pt\par #8. F/u in 6 months PFTs\par #9. Pt had both Covid vaccines, booster, and flu vaccines\par #10. Zyrtec/Allegra/Claritin for allergies and Flonase nasal spray for post nasal drip as needed \par #11. Reviewed risks of exposure and symptoms of Covid-19 virus, including how the virus is spread and precautions to avoid heladio virus.\par \par The patient expressed understanding and agreement with the above recommendations/plan and accepts responsibility to be compliant with recommended testing, therapies, and f/u visits.\par All relevant questions and concerns were addressed.

## 2022-02-15 NOTE — HISTORY OF PRESENT ILLNESS
[Former] : former [Doing Well] : doing well [Difficulty Breathing During Exertion] : denies dyspnea on exertion [Feelings Of Weakness On Exertion] : denies exercise intolerance [Cough] : stable coughing [Coughing Up Sputum] : denies coughing up sputum [Wheezing] : denies wheezing [Regional Soft Tissue Swelling Both Lower Extremities] : denies lower extremity edema [None] : None [Adherent] : the patient is adherent with ~his/her~ medication regimen [Goals--Doing Well] : the patient is doing well with ~his/her~ COPD goals [CT Scan] : a CT scan [On ___] : performed on [unfilled] [Patient] : the patient [Indication ___] : for an indication of [unfilled] [TextBox_4] :   The patient had a h/o a new nodule which was thought to be inflammatory or infectious and for which she had a course of Abx. PET CT was performed but was negative. 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 She underwent VATS and resection 7/2014. She has recovered well without new complaints.\par She has residual nodules which have been stable on her chest CT.\par She has been doing well with Flonase for PNDS\par Pt s/p fall at [facility] in Oct 2021. She fell face front with broken nose, broken teeth, and mild SDH per pt. She was in [facility] for observation for 2 days and was released. She reports f/u with neurology was unremarkable. [TextBox_11] : 1 [TextBox_13] : 25 [YearQuit] : 1990 [FreeTextEntry5] : Chest CT [FreeTextEntry8] : Essentially stable nodules except for 3 new or more conspicuous nodules

## 2022-08-12 LAB — SARS-COV-2 N GENE NPH QL NAA+PROBE: NOT DETECTED

## 2022-08-15 ENCOUNTER — APPOINTMENT (OUTPATIENT)
Dept: PULMONOLOGY | Facility: CLINIC | Age: 87
End: 2022-08-15

## 2022-08-15 VITALS — WEIGHT: 109 LBS | BODY MASS INDEX: 20.06 KG/M2 | HEIGHT: 62 IN

## 2022-08-15 PROCEDURE — 94727 GAS DIL/WSHOT DETER LNG VOL: CPT

## 2022-08-15 PROCEDURE — 94729 DIFFUSING CAPACITY: CPT

## 2022-08-15 PROCEDURE — 94010 BREATHING CAPACITY TEST: CPT

## 2022-08-15 PROCEDURE — 85018 HEMOGLOBIN: CPT | Mod: QW

## 2022-09-13 ENCOUNTER — APPOINTMENT (OUTPATIENT)
Dept: PULMONOLOGY | Facility: CLINIC | Age: 87
End: 2022-09-13

## 2022-09-13 VITALS
OXYGEN SATURATION: 95 % | HEIGHT: 60 IN | RESPIRATION RATE: 16 BRPM | SYSTOLIC BLOOD PRESSURE: 132 MMHG | BODY MASS INDEX: 21.6 KG/M2 | DIASTOLIC BLOOD PRESSURE: 80 MMHG | WEIGHT: 110 LBS

## 2022-09-13 PROCEDURE — 99214 OFFICE O/P EST MOD 30 MIN: CPT

## 2022-09-13 NOTE — PROCEDURE
[FreeTextEntry1] : PFTs 4/17/19 - Mild restriction without significant change from her previous; lung volumes were essentially normal with a mildly reduced diffusion capacity; essentially unchanged with perhaps mild deterioration over the past 3-4 years.\par PFTs 2/22/21 - Mild obstruction on spirometry with normal lung volumes and near normal diffusion capacity; overall, at baseline\par PFTs 2/15/22 - Mildly reduced FVC and moderately reduced FEV1 without an obstructive pattern with mildly reduced lung volumes and diffusion capacity; slightly worse than previous\par PFTs 8/15/22 - Mildly reduced FVC and FEV1 with an obstructive pattern with normal lung volumes and diffusion capacity; improved from previous

## 2022-09-13 NOTE — HISTORY OF PRESENT ILLNESS
[Former] : former [Doing Well] : doing well [Difficulty Breathing During Exertion] : denies dyspnea on exertion [Feelings Of Weakness On Exertion] : denies exercise intolerance [Cough] : stable coughing [Coughing Up Sputum] : denies coughing up sputum [Wheezing] : denies wheezing [Regional Soft Tissue Swelling Both Lower Extremities] : denies lower extremity edema [None] : None [Adherent] : the patient is adherent with ~his/her~ medication regimen [Goals--Doing Well] : the patient is doing well with ~his/her~ COPD goals [CT Scan] : a CT scan [On ___] : performed on [unfilled] [Patient] : the patient [Indication ___] : for an indication of [unfilled] [TextBox_4] :   The patient had a h/o a new nodule which was thought to be inflammatory or infectious and for which she had a course of Abx. PET CT was performed but was negative. 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 She underwent VATS and resection 7/2014. She has recovered well without new complaints.\par She has residual nodules which have been stable on her chest CT.\par She has been doing well with Flonase for PNDS\par Pt follows with Dr. Redman of oncology.\par  [TextBox_11] : 1 [TextBox_13] : 25 [YearQuit] : 1990 [FreeTextEntry5] : Chest CT [FreeTextEntry8] : Essentially stable nodules except for 3 new or more conspicuous nodules

## 2022-09-13 NOTE — REVIEW OF SYSTEMS
[Postnasal Drip] : postnasal drip [Cough] : cough [SOB on Exertion] : sob on exertion [Palpitations] : palpitations [Anemia] : anemia [Diabetes] : diabetes [Thyroid Problem] : thyroid problem [Negative] : Psychiatric [TextBox_94] : Knee discomfort from arthritis

## 2022-09-13 NOTE — PHYSICAL EXAM
[No Acute Distress] : no acute distress [Normal Appearance] : normal appearance [Supple] : supple [Normal Rate/Rhythm] : normal rate/rhythm [Murmur ___ / 6] : murmur [unfilled] / 6 [Normal S1, S2] : normal s1, s2 [No Murmurs] : no murmurs [No Resp Distress] : no resp distress [No Acc Muscle Use] : no acc muscle use [Normal Rhythm and Effort] : normal rhythm and effort [Clear to Auscultation Bilaterally] : clear to auscultation bilaterally [No Abnormalities] : no abnormalities [Benign] : benign [Not Tender] : not tender [No Clubbing] : no clubbing [No Edema] : no edema [Oriented x3] : oriented x3 [No Focal Deficits] : no focal deficits

## 2022-09-13 NOTE — CONSULT LETTER
[Dear  ___] : Dear  [unfilled], [Consult Letter:] : I had the pleasure of evaluating your patient, [unfilled]. [Please see my note below.] : Please see my note below. [Consult Closing:] : Thank you very much for allowing me to participate in the care of this patient.  If you have any questions, please do not hesitate to contact me. [Sincerely,] : Sincerely, [DrAlec  ___] : Dr. CONTI [FreeTextEntry3] : Gustavo Thompson MD, FCCP, D. ABSM\par Pulmonary and Sleep Medicine\par [facility] Pulmonary [facility] Sleep Medicine at [place]

## 2022-09-13 NOTE — DISCUSSION/SUMMARY
[FreeTextEntry1] : \par #1. The patient's COPD appears to be at relative baseline clinically and will continue her Spiriva daily; consider additional therapy with Anoro or Breo if lung function worsens\par #2. She will continue her Nasonex and for her postnasal drip. She is no longer on Montelukast\par #3. I have also recommended exercise to improve her exercise tolerance.\par #4. Thoracic surgery f/u as needed post resection of nodule c/w adenoCa. \par #5. Oncology f/u for h/o of breast Ca and lung Ca; she is followed by Dr. Redman for anemia\par #6. Continue hematology w/u for chronic anemia as required\par #7. F/u chest CT revealed stable nodules; could consider repeat in one year if desired by pt; oncology f/u\par #8. F/u in 6 months with chest CT\par #9. Pt had both Covid vaccines, booster, and flu vaccines\par #10. Zyrtec/Allegra/Claritin for allergies and Flonase nasal spray for post nasal drip as needed \par #11. Reviewed risks of exposure and symptoms of Covid-19 virus, including how the virus is spread and precautions to avoid heladio virus.\par \par The patient expressed understanding and agreement with the above recommendations/plan and accepts responsibility to be compliant with recommended testing, therapies, and f/u visits.\par All relevant questions and concerns were addressed.

## 2023-03-21 ENCOUNTER — APPOINTMENT (OUTPATIENT)
Dept: PULMONOLOGY | Facility: CLINIC | Age: 88
End: 2023-03-21
Payer: MEDICARE

## 2023-03-21 VITALS
WEIGHT: 107 LBS | BODY MASS INDEX: 20.9 KG/M2 | OXYGEN SATURATION: 98 % | DIASTOLIC BLOOD PRESSURE: 60 MMHG | RESPIRATION RATE: 20 BRPM | SYSTOLIC BLOOD PRESSURE: 134 MMHG | HEART RATE: 67 BPM

## 2023-03-21 PROCEDURE — 99214 OFFICE O/P EST MOD 30 MIN: CPT

## 2023-03-21 NOTE — CONSULT LETTER
[Dear  ___] : Dear  [unfilled], [Consult Letter:] : I had the pleasure of evaluating your patient, [unfilled]. [Please see my note below.] : Please see my note below. [Consult Closing:] : Thank you very much for allowing me to participate in the care of this patient.  If you have any questions, please do not hesitate to contact me. [Sincerely,] : Sincerely, [DrAlec  ___] : Dr. CONTI [FreeTextEntry3] : Gustavo Thompson MD, FCCP, D. ABSM\par Pulmonary and Sleep Medicine\par [facility]monary [facility] Sleep Medicine at [place]

## 2023-03-21 NOTE — HISTORY OF PRESENT ILLNESS
[Doing Well] : doing well [Difficulty Breathing During Exertion] : denies dyspnea on exertion [Feelings Of Weakness On Exertion] : denies exercise intolerance [Cough] : stable coughing [Coughing Up Sputum] : denies coughing up sputum [Wheezing] : denies wheezing [Regional Soft Tissue Swelling Both Lower Extremities] : denies lower extremity edema [None] : None [Adherent] : the patient is adherent with ~his/her~ medication regimen [Goals--Doing Well] : the patient is doing well with ~his/her~ COPD goals [CT Scan] : a CT scan [On ___] : performed on [unfilled] [Patient] : the patient [Indication ___] : for an indication of [unfilled] [TextBox_4] : Former smoker of 1 ppd x 25 years, quit 1990  \par The patient had a h/o a new nodule which was thought to be inflammatory or infectious and for which she had a course of Abx. PET CT was performed but was negative. 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 She underwent VATS and resection 7/2014. She has recovered well without new complaints.\par She has residual nodules which have been stable on her chest CT but ? increase in one 3 mm nodule.\par She has been doing well with Flonase for PNDS\par Pt follows with Dr. Redman of oncology.\par  [FreeTextEntry5] : Chest CT [FreeTextEntry8] : Essentially stable nodules except for 3 new or more conspicuous nodules

## 2023-03-21 NOTE — DISCUSSION/SUMMARY
[FreeTextEntry1] : \par #1. The patient's COPD appears to be at relative baseline clinically and will continue her Spiriva daily; consider additional therapy with Anoro or Breo if lung function worsens\par #2. She will continue her Nasonex and for her postnasal drip. She is no longer on Montelukast\par #3. I have also recommended exercise to improve her exercise tolerance.\par #4. Thoracic surgery f/u as needed post resection of nodule c/w adenoCa. \par #5. Oncology f/u for h/o of breast Ca and lung Ca; she is followed by Dr. Redman for anemia\par #6. Continue hematology w/u for chronic anemia as required\par #7. F/u chest CT revealed essentially stable nodules with ? slight increase in one nodule to 3 mm in size\par #8. F/u in 6 months with chest CT and PFTs given ? slight increase in one nodule to 3 mm in size\par #9. Pt had both Covid vaccines, booster, and flu vaccines\par #10. Zyrtec/Allegra/Claritin for allergies and Flonase nasal spray for post nasal drip as needed \par #11. Reviewed risks of exposure and symptoms of Covid-19 virus, including how the virus is spread and precautions to avoid heladio virus.\par \par The patient expressed understanding and agreement with the above recommendations/plan and accepts responsibility to be compliant with recommended testing, therapies, and f/u visits.\par All relevant questions and concerns were addressed.

## 2023-04-19 ENCOUNTER — OFFICE (OUTPATIENT)
Dept: URBAN - METROPOLITAN AREA CLINIC 104 | Facility: CLINIC | Age: 88
Setting detail: OPHTHALMOLOGY
End: 2023-04-19
Payer: MEDICARE

## 2023-04-19 DIAGNOSIS — H43.813: ICD-10-CM

## 2023-04-19 DIAGNOSIS — H01.004: ICD-10-CM

## 2023-04-19 DIAGNOSIS — Z96.1: ICD-10-CM

## 2023-04-19 DIAGNOSIS — H16.222: ICD-10-CM

## 2023-04-19 DIAGNOSIS — Z79.84: ICD-10-CM

## 2023-04-19 DIAGNOSIS — H01.005: ICD-10-CM

## 2023-04-19 DIAGNOSIS — E11.9: ICD-10-CM

## 2023-04-19 DIAGNOSIS — H01.002: ICD-10-CM

## 2023-04-19 DIAGNOSIS — H01.001: ICD-10-CM

## 2023-04-19 PROCEDURE — 92015 DETERMINE REFRACTIVE STATE: CPT | Performed by: SPECIALIST

## 2023-04-19 PROCEDURE — 92014 COMPRE OPH EXAM EST PT 1/>: CPT | Performed by: SPECIALIST

## 2023-04-19 ASSESSMENT — LID EXAM ASSESSMENTS
OS_BLEPHARITIS: LLL LUL 3+
OD_BLEPHARITIS: RLL RUL 3+

## 2023-04-19 ASSESSMENT — REFRACTION_CURRENTRX
OS_OVR_VA: 20/
OD_OVR_VA: 20/

## 2023-04-19 ASSESSMENT — REFRACTION_AUTOREFRACTION
OD_CYLINDER: -2.50
OS_SPHERE: +1.50
OS_CYLINDER: -1.25
OS_AXIS: 103
OD_SPHERE: +2.00
OD_AXIS: 93

## 2023-04-19 ASSESSMENT — TEAR BREAK UP TIME (TBUT)
OS_TBUT: 2+
OD_TBUT: 2+

## 2023-04-19 ASSESSMENT — VISUAL ACUITY
OD_BCVA: 20/60
OS_BCVA: 20/60

## 2023-04-19 ASSESSMENT — TONOMETRY
OS_IOP_MMHG: 18
OD_IOP_MMHG: 18

## 2023-04-19 ASSESSMENT — REFRACTION_MANIFEST
OD_VA1: 20/30
OS_ADD: +2.50
OS_CYLINDER: -1.25
OS_SPHERE: +1.50
OS_AXIS: 105
OS_VA1: 20/30
OD_ADD: +2.50
OD_SPHERE: +2.00
OD_CYLINDER: -2.25
OD_AXIS: 93

## 2023-04-19 ASSESSMENT — CONFRONTATIONAL VISUAL FIELD TEST (CVF)
OS_FINDINGS: FULL
OD_FINDINGS: FULL

## 2023-04-19 ASSESSMENT — SPHEQUIV_DERIVED
OS_SPHEQUIV: 0.875
OD_SPHEQUIV: 0.75
OD_SPHEQUIV: 0.875
OS_SPHEQUIV: 0.875

## 2023-04-19 ASSESSMENT — PUNCTA - ASSESSMENT: OD_PUNCTA: SCLEROSED

## 2023-09-12 ENCOUNTER — NON-APPOINTMENT (OUTPATIENT)
Age: 88
End: 2023-09-12

## 2023-09-19 ENCOUNTER — OFFICE (OUTPATIENT)
Dept: URBAN - METROPOLITAN AREA CLINIC 104 | Facility: CLINIC | Age: 88
Setting detail: OPHTHALMOLOGY
End: 2023-09-19
Payer: MEDICARE

## 2023-09-19 DIAGNOSIS — G45.3: ICD-10-CM

## 2023-09-19 DIAGNOSIS — H43.813: ICD-10-CM

## 2023-09-19 DIAGNOSIS — H01.004: ICD-10-CM

## 2023-09-19 DIAGNOSIS — H01.005: ICD-10-CM

## 2023-09-19 DIAGNOSIS — H16.222: ICD-10-CM

## 2023-09-19 DIAGNOSIS — H01.001: ICD-10-CM

## 2023-09-19 DIAGNOSIS — Z79.84: ICD-10-CM

## 2023-09-19 DIAGNOSIS — H01.002: ICD-10-CM

## 2023-09-19 DIAGNOSIS — Z96.1: ICD-10-CM

## 2023-09-19 DIAGNOSIS — E11.9: ICD-10-CM

## 2023-09-19 PROCEDURE — 92014 COMPRE OPH EXAM EST PT 1/>: CPT | Performed by: SPECIALIST

## 2023-09-19 ASSESSMENT — LID EXAM ASSESSMENTS
OS_BLEPHARITIS: LLL LUL 3+
OD_BLEPHARITIS: RLL RUL 3+

## 2023-09-19 ASSESSMENT — TONOMETRY
OS_IOP_MMHG: 16
OD_IOP_MMHG: 16

## 2023-09-19 ASSESSMENT — KERATOMETRY
OS_AXISANGLE_DEGREES: 179
OD_K1POWER_DIOPTERS: 42.24
OD_AXISANGLE_DEGREES: 172
OS_K1POWER_DIOPTERS: 41.98
OD_K2POWER_DIOPTERS: 44.35
OS_K2POWER_DIOPTERS: 44.23

## 2023-09-19 ASSESSMENT — AXIALLENGTH_DERIVED
OD_AL: 23.2344
OS_AL: 23.21

## 2023-09-19 ASSESSMENT — TEAR BREAK UP TIME (TBUT)
OD_TBUT: 2+
OS_TBUT: 2+

## 2023-09-19 ASSESSMENT — SPHEQUIV_DERIVED
OS_SPHEQUIV: 1.375
OD_SPHEQUIV: 1.125

## 2023-09-19 ASSESSMENT — REFRACTION_AUTOREFRACTION
OS_AXIS: 125
OD_AXIS: 077
OS_CYLINDER: -1.75
OD_CYLINDER: -2.25
OD_SPHERE: +2.25
OS_SPHERE: +2.25

## 2023-09-19 ASSESSMENT — PUNCTA - ASSESSMENT: OD_PUNCTA: SCLEROSED

## 2023-09-19 ASSESSMENT — CONFRONTATIONAL VISUAL FIELD TEST (CVF)
OS_FINDINGS: FULL
OD_FINDINGS: FULL

## 2023-09-19 ASSESSMENT — VISUAL ACUITY
OD_BCVA: 20/50
OS_BCVA: 20/70

## 2023-09-20 ENCOUNTER — APPOINTMENT (OUTPATIENT)
Dept: PULMONOLOGY | Facility: CLINIC | Age: 88
End: 2023-09-20
Payer: MEDICARE

## 2023-09-20 VITALS — WEIGHT: 106 LBS | HEIGHT: 61 IN | BODY MASS INDEX: 20.01 KG/M2

## 2023-09-20 VITALS
DIASTOLIC BLOOD PRESSURE: 68 MMHG | SYSTOLIC BLOOD PRESSURE: 138 MMHG | OXYGEN SATURATION: 95 % | RESPIRATION RATE: 16 BRPM | HEART RATE: 73 BPM

## 2023-09-20 DIAGNOSIS — Z87.891 PERSONAL HISTORY OF NICOTINE DEPENDENCE: ICD-10-CM

## 2023-09-20 PROCEDURE — 94010 BREATHING CAPACITY TEST: CPT

## 2023-09-20 PROCEDURE — 94729 DIFFUSING CAPACITY: CPT

## 2023-09-20 PROCEDURE — 99214 OFFICE O/P EST MOD 30 MIN: CPT | Mod: 25

## 2023-09-20 PROCEDURE — 85018 HEMOGLOBIN: CPT | Mod: QW

## 2023-09-20 PROCEDURE — 94727 GAS DIL/WSHOT DETER LNG VOL: CPT

## 2024-02-01 ENCOUNTER — APPOINTMENT (OUTPATIENT)
Dept: PULMONOLOGY | Facility: CLINIC | Age: 89
End: 2024-02-01
Payer: MEDICARE

## 2024-02-01 VITALS
SYSTOLIC BLOOD PRESSURE: 144 MMHG | WEIGHT: 102 LBS | HEIGHT: 61 IN | OXYGEN SATURATION: 97 % | RESPIRATION RATE: 16 BRPM | DIASTOLIC BLOOD PRESSURE: 70 MMHG | HEART RATE: 72 BPM | BODY MASS INDEX: 19.26 KG/M2

## 2024-02-01 PROCEDURE — 99214 OFFICE O/P EST MOD 30 MIN: CPT

## 2024-02-01 RX ORDER — PREDNISONE 10 MG/1
10 TABLET ORAL
Qty: 50 | Refills: 0 | Status: ACTIVE | COMMUNITY
Start: 2024-02-01 | End: 1900-01-01

## 2024-02-01 NOTE — RESULTS/DATA
[TextEntry] : Chest CT from 2/18/14 reveals a right middle lobe new tubular nodule this may be secondary to an infectious/inflammatory process  Chest CT from 5/5/14 reveals a change in the nodules with increase in size  Chest CT from 1/6/15 post-op changes with resection of previous nodule otherwise stability of other nodules  Chest CT from 7/7/15 revealed stable changes as per the report  Chest CT from 1/4/16 revealed stable changes  Chest CT from 7/6/16 revealed stable changes CXR from 12/16/16 revealed chronic scarring in RML Other Chest CT imaging as above.

## 2024-02-01 NOTE — CONSULT LETTER
[Dear  ___] : Dear  [unfilled], [Consult Letter:] : I had the pleasure of evaluating your patient, [unfilled]. [Please see my note below.] : Please see my note below. [Consult Closing:] : Thank you very much for allowing me to participate in the care of this patient.  If you have any questions, please do not hesitate to contact me. [Sincerely,] : Sincerely, [DrAlec  ___] : Dr. CONTI [FreeTextEntry3] : Gustavo Thompson MD, FCCP, D. ABSM\par  Pulmonary and Sleep Medicine\par  [facility] at [place]

## 2024-02-01 NOTE — PROCEDURE
[FreeTextEntry1] : PFTs 4/17/19 - Mild restriction without significant change from her previous; lung volumes were essentially normal with a mildly reduced diffusion capacity; essentially unchanged with perhaps mild deterioration over the past 3-4 years. PFTs 2/22/21 - Mild obstruction on spirometry with normal lung volumes and near normal diffusion capacity; overall, at baseline PFTs 2/15/22 - Mildly reduced FVC and moderately reduced FEV1 without an obstructive pattern with mildly reduced lung volumes and diffusion capacity; slightly worse than previous PFTs 8/15/22 - Mildly reduced FVC and FEV1 with an obstructive pattern with normal lung volumes and diffusion capacity; improved from previous. PFTs 9/20/23 - Normal FVC and mildly reduced FEV1 with obstruction and borderline reduced lung volumes and moderately reduced DLCO which is new; overall, reduced PFTs c/w previous.

## 2024-02-01 NOTE — HISTORY OF PRESENT ILLNESS
[Doing Well] : doing well [Difficulty Breathing During Exertion] : denies dyspnea on exertion [Feelings Of Weakness On Exertion] : denies exercise intolerance [Cough] : stable coughing [Coughing Up Sputum] : denies coughing up sputum [Wheezing] : denies wheezing [Regional Soft Tissue Swelling Both Lower Extremities] : denies lower extremity edema [None] : None [Adherent] : the patient is adherent with ~his/her~ medication regimen [Goals--Doing Well] : the patient is doing well with ~his/her~ COPD goals [CT Scan] : a CT scan [On ___] : performed on [unfilled] [Patient] : the patient [Indication ___] : for an indication of [unfilled] [TextBox_4] : Former smoker of 1 ppd x 25 years, quit 1990   The patient had a h/o a new nodule which was thought to be inflammatory or infectious and for which she had a course of Abx. PET CT was performed but was negative. 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 She underwent VATS and resection 7/2014. She has recovered well without new complaints. She has residual nodules which have been stable on her chest CT but ? increase in one 3 mm nodule. She has been doing well with Flonase for PNDS Pt follows with Dr. Redman of oncology. Pt with recent URI/sinusitis but swab was negative. Residual nasal congestion and cough.  [FreeTextEntry5] : Chest CT [FreeTextEntry8] : Essentially stable nodules except for 3 new or more conspicuous nodules [TextEntry] : Chest CT from 11/28/17 revealed essentially stable or improved nodules c/w her previous. Chest CT from 5/21/18 revealed essentially stable nodules with ? new 2 mm RUBIA nodule  Chest CT from 1/9/19 revealed near stable nodules with ? minimal increase but with 2 new nodules measuring 5 and 6 mm Chest CT from 4/10/19 revealed essentially stable nodules with one new 3 mm RLL nodule  Chest CT from 11/18/19 revealed essentially stable nodules including the recent new 3 mm nodule  Chest CT from 12/2/20 revealed stable nodules Chest CT from 2/9/22 revealed stable nodules  Chest CT from 3/17/23 revealed essentially stable nodules with ? increase in one nodule to 3 mm in size. Chest CT from 9/12/23 revealed stable nodules c/w previous - reviewed results and films with patient.

## 2024-02-01 NOTE — END OF VISIT
[Time Spent: ___ minutes] : I have spent [unfilled] minutes of time on the encounter. [TextEntry] : Discussed with pt at length regarding COPD, PNDS, h/o lung cancer, pulmonary nodules, soobe; reviewed w/u with pt as above.

## 2024-02-01 NOTE — DISCUSSION/SUMMARY
[FreeTextEntry1] : #1. The patient's COPD appears to be at relative baseline clinically though reports some increase in SOBOE despite her Spiriva daily. Changed to Anoro daily given increased symptoms and worsening PFTS. #2. She will continue her Nasonex and for her postnasal drip. She is no longer on Montelukast. #3. I have also recommended exercise to improve her exercise tolerance. #4. Thoracic surgery f/u as needed post resection of nodule c/w adenoCa.  #5. Oncology f/u for h/o of breast Ca and lung Ca; she is followed by Dr. Redman for anemia. #6. Continue hematology w/u for chronic anemia as required. #7. Prior chest CT revealed essentially stable nodules with ? slight increase in one nodule to 3 mm in size but then repeat CT with stable nodules; f/u perhaps in one year but pt will continue to f/u with oncology. #8. Prednisone taper for cough and URI symptoms. #9. Pt had both Covid vaccines, booster, and flu vaccines. #10. Zyrtec/Allegra/Claritin for allergies and Flonase nasal spray for postnasal drip as needed. #11. F/u in 4 months with donna to assess response to Anoro with donna; sooner if remains symptomatic.  The patient expressed understanding and agreement with the above recommendations/plan and accepts responsibility to be compliant with recommended testing, therapies, and f/u visits. All relevant questions and concerns were addressed.

## 2024-02-01 NOTE — PHYSICAL EXAM
[No Acute Distress] : no acute distress [Normal Appearance] : normal appearance [Supple] : supple [Normal Rate/Rhythm] : normal rate/rhythm [Murmur ___ / 6] : murmur [unfilled] / 6 [Normal S1, S2] : normal s1, s2 [No Murmurs] : no murmurs [No Resp Distress] : no resp distress [No Acc Muscle Use] : no acc muscle use [Normal Rhythm and Effort] : normal rhythm and effort [Clear to Auscultation Bilaterally] : clear to auscultation bilaterally [No Abnormalities] : no abnormalities [Benign] : benign [Not Tender] : not tender [No Clubbing] : no clubbing [No Edema] : no edema [No Focal Deficits] : no focal deficits [Oriented x3] : oriented x3

## 2024-02-20 ENCOUNTER — RX RENEWAL (OUTPATIENT)
Age: 89
End: 2024-02-20

## 2024-02-20 RX ORDER — UMECLIDINIUM BROMIDE AND VILANTEROL TRIFENATATE 62.5; 25 UG/1; UG/1
62.5-25 POWDER RESPIRATORY (INHALATION) DAILY
Qty: 180 | Refills: 3 | Status: ACTIVE | COMMUNITY
Start: 2023-09-20 | End: 1900-01-01

## 2024-06-03 ENCOUNTER — APPOINTMENT (OUTPATIENT)
Dept: PULMONOLOGY | Facility: CLINIC | Age: 89
End: 2024-06-03
Payer: MEDICARE

## 2024-06-03 VITALS — BODY MASS INDEX: 19.63 KG/M2 | WEIGHT: 104 LBS | HEIGHT: 61 IN

## 2024-06-03 VITALS
SYSTOLIC BLOOD PRESSURE: 134 MMHG | OXYGEN SATURATION: 95 % | DIASTOLIC BLOOD PRESSURE: 64 MMHG | HEART RATE: 77 BPM | RESPIRATION RATE: 16 BRPM

## 2024-06-03 DIAGNOSIS — C34.01 MALIGNANT NEOPLASM OF RIGHT MAIN BRONCHUS: ICD-10-CM

## 2024-06-03 DIAGNOSIS — R09.82 POSTNASAL DRIP: ICD-10-CM

## 2024-06-03 DIAGNOSIS — Z87.891 PERSONAL HISTORY OF NICOTINE DEPENDENCE: ICD-10-CM

## 2024-06-03 DIAGNOSIS — J44.9 CHRONIC OBSTRUCTIVE PULMONARY DISEASE, UNSPECIFIED: ICD-10-CM

## 2024-06-03 DIAGNOSIS — R93.89 ABNORMAL FINDINGS ON DIAGNOSTIC IMAGING OF OTHER SPECIFIED BODY STRUCTURES: ICD-10-CM

## 2024-06-03 DIAGNOSIS — R91.8 OTHER NONSPECIFIC ABNORMAL FINDING OF LUNG FIELD: ICD-10-CM

## 2024-06-03 DIAGNOSIS — R06.02 SHORTNESS OF BREATH: ICD-10-CM

## 2024-06-03 PROCEDURE — 94010 BREATHING CAPACITY TEST: CPT

## 2024-06-03 PROCEDURE — 99214 OFFICE O/P EST MOD 30 MIN: CPT | Mod: 25

## 2024-06-03 NOTE — HISTORY OF PRESENT ILLNESS
[Doing Well] : doing well [Difficulty Breathing During Exertion] : denies dyspnea on exertion [Feelings Of Weakness On Exertion] : denies exercise intolerance [Cough] : stable coughing [Coughing Up Sputum] : denies coughing up sputum [Wheezing] : denies wheezing [Regional Soft Tissue Swelling Both Lower Extremities] : denies lower extremity edema [None] : None [Adherent] : the patient is adherent with ~his/her~ medication regimen [Goals--Doing Well] : the patient is doing well with ~his/her~ COPD goals [CT Scan] : a CT scan [On ___] : performed on [unfilled] [Patient] : the patient [Indication ___] : for an indication of [unfilled] [TextBox_4] : Former smoker of 1 ppd x 25 years, quit 1990   The patient had a h/o a new nodule which was thought to be inflammatory or infectious and for which she had a course of Abx. PET CT was performed but was negative. 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 She underwent VATS and resection 7/2014. She has recovered well without new complaints. She has residual nodules which have been stable on her chest CT but ? increase in one 3 mm nodule. She has been doing well with Flonase for PNDS Pt follows with Dr. Redman of oncology. Pt with recent URI/sinusitis but swab was negative. Residual nasal congestion and cough.  [FreeTextEntry5] : Chest CT [FreeTextEntry8] : Essentially stable nodules except for 3 new or more conspicuous nodules [TextEntry] : Chest CT from 11/28/17 revealed essentially stable or improved nodules c/w her previous. Chest CT from 5/21/18 revealed essentially stable nodules with ? new 2 mm RUBIA nodule  Chest CT from 1/9/19 revealed near stable nodules with ? minimal increase but with 2 new nodules measuring 5 and 6 mm Chest CT from 4/10/19 revealed essentially stable nodules with one new 3 mm RLL nodule  Chest CT from 11/18/19 revealed essentially stable nodules including the recent new 3 mm nodule  Chest CT from 12/2/20 revealed stable nodules Chest CT from 2/9/22 revealed stable nodules  Chest CT from 3/17/23 revealed essentially stable nodules with ? increase in one nodule to 3 mm in size. Chest CT from 9/12/23 revealed stable nodules c/w previous.

## 2024-06-03 NOTE — CONSULT LETTER
[Dear  ___] : Dear  [unfilled], [Consult Letter:] : I had the pleasure of evaluating your patient, [unfilled]. [Please see my note below.] : Please see my note below. [Consult Closing:] : Thank you very much for allowing me to participate in the care of this patient.  If you have any questions, please do not hesitate to contact me. [Sincerely,] : Sincerely, [DrAlec  ___] : Dr. CONTI [FreeTextEntry3] : Gustavo Thompson MD, FCCP, D. ABSM\par  Pulmonary and Sleep Medicine\par  [facility] at [place]

## 2024-06-03 NOTE — DISCUSSION/SUMMARY
[FreeTextEntry1] : #1. The patient's COPD appears to be at relative baseline clinically though reports some increase in SOBOE despite her Spiriva daily. Changed to Anoro daily given increased symptoms and worsening PFTS. #2. She will continue her Nasonex and for her postnasal drip. She is no longer on Montelukast. #3. I have also recommended exercise to improve her exercise tolerance. #4. Thoracic surgery f/u as needed post resection of nodule c/w adenoCa.  #5. Oncology f/u for h/o of breast Ca and lung Ca; she is followed by Dr. Redman for anemia. #6. Continue hematology w/u for chronic anemia as required. #7. Prior chest CT revealed essentially stable nodules with ? slight increase in one nodule to 3 mm in size but then repeat CT with stable nodules; f/u perhaps in one year but pt will continue to f/u with oncology. #8. Prednisone taper for cough and URI symptoms. #9. Pt had both Covid vaccines, booster, and flu vaccines. #10. Zyrtec/Allegra/Claritin for allergies and Flonase nasal spray for postnasal drip as needed. #11. F/u in 6 months. Consider repeat chest CT if not done by oncology by the end of the year.  The patient expressed understanding and agreement with the above recommendations/plan and accepts responsibility to be compliant with recommended testing, therapies, and f/u visits. All relevant questions and concerns were addressed.

## 2024-06-03 NOTE — PROCEDURE
[FreeTextEntry1] : PFTs 4/17/19 - Mild restriction without significant change from her previous; lung volumes were essentially normal with a mildly reduced diffusion capacity; essentially unchanged with perhaps mild deterioration over the past 3-4 years. PFTs 2/22/21 - Mild obstruction on spirometry with normal lung volumes and near normal diffusion capacity; overall, at baseline PFTs 2/15/22 - Mildly reduced FVC and moderately reduced FEV1 without an obstructive pattern with mildly reduced lung volumes and diffusion capacity; slightly worse than previous. PFTs 8/15/22 - Mildly reduced FVC and FEV1 with an obstructive pattern with normal lung volumes and diffusion capacity; improved from previous. PFTs 9/20/23 - Normal FVC and mildly reduced FEV1 with obstruction and borderline reduced lung volumes and moderately reduced DLCO which is new; overall, reduced PFTs c/w previous. [place] 6/3/24 - Normal FVC and mildly reduced FEV with obstruction; near baseline.

## 2024-09-18 ENCOUNTER — APPOINTMENT (OUTPATIENT)
Dept: PULMONOLOGY | Facility: CLINIC | Age: 89
End: 2024-09-18
Payer: MEDICARE

## 2024-09-18 VITALS — WEIGHT: 106 LBS | HEIGHT: 61 IN | BODY MASS INDEX: 20.01 KG/M2

## 2024-09-18 VITALS
HEART RATE: 84 BPM | OXYGEN SATURATION: 98 % | RESPIRATION RATE: 16 BRPM | SYSTOLIC BLOOD PRESSURE: 160 MMHG | DIASTOLIC BLOOD PRESSURE: 70 MMHG

## 2024-09-18 DIAGNOSIS — R91.8 OTHER NONSPECIFIC ABNORMAL FINDING OF LUNG FIELD: ICD-10-CM

## 2024-09-18 DIAGNOSIS — R93.89 ABNORMAL FINDINGS ON DIAGNOSTIC IMAGING OF OTHER SPECIFIED BODY STRUCTURES: ICD-10-CM

## 2024-09-18 DIAGNOSIS — R09.82 POSTNASAL DRIP: ICD-10-CM

## 2024-09-18 DIAGNOSIS — Z87.891 PERSONAL HISTORY OF NICOTINE DEPENDENCE: ICD-10-CM

## 2024-09-18 DIAGNOSIS — J44.9 CHRONIC OBSTRUCTIVE PULMONARY DISEASE, UNSPECIFIED: ICD-10-CM

## 2024-09-18 DIAGNOSIS — C34.01 MALIGNANT NEOPLASM OF RIGHT MAIN BRONCHUS: ICD-10-CM

## 2024-09-18 DIAGNOSIS — R06.02 SHORTNESS OF BREATH: ICD-10-CM

## 2024-09-18 PROCEDURE — 99215 OFFICE O/P EST HI 40 MIN: CPT

## 2024-09-18 PROCEDURE — G2211 COMPLEX E/M VISIT ADD ON: CPT

## 2024-09-18 NOTE — CONSULT LETTER
[Dear  ___] : Dear  [unfilled], [Consult Letter:] : I had the pleasure of evaluating your patient, [unfilled]. [Please see my note below.] : Please see my note below. [Consult Closing:] : Thank you very much for allowing me to participate in the care of this patient.  If you have any questions, please do not hesitate to contact me. [Sincerely,] : Sincerely, [DrAlec  ___] : Dr. CONTI [DrAlec ___] : Dr. CONTI [FreeTextEntry3] : Gustavo Thompson MD, FCCP, D. ABSM\par  Pulmonary and Sleep Medicine\par  [facility]monary [facility] at [place]

## 2024-09-18 NOTE — PROCEDURE
[FreeTextEntry1] : PFTs 4/17/19 - Mild restriction without significant change from her previous; lung volumes were essentially normal with a mildly reduced diffusion capacity; essentially unchanged with perhaps mild deterioration over the past 3-4 years. PFTs 2/22/21 - Mild obstruction on spirometry with normal lung volumes and near normal diffusion capacity; overall, at baseline PFTs 2/15/22 - Mildly reduced FVC and moderately reduced FEV1 without an obstructive pattern with mildly reduced lung volumes and diffusion capacity; slightly worse than previous. PFTs 8/15/22 - Mildly reduced FVC and FEV1 with an obstructive pattern with normal lung volumes and diffusion capacity; improved from previous. PFTs 9/20/23 - Normal FVC and mildly reduced FEV1 with obstruction and borderline reduced lung volumes and moderately reduced DLCO which is new; overall, reduced PFTs c/w previous. [place] 6/3/24 - Normal FVC and mildly reduced FEV with obstruction; near baseline.

## 2024-09-18 NOTE — END OF VISIT
[Time Spent: ___ minutes] : I have spent [unfilled] minutes of time on the encounter which excludes teaching and separately reported services. [TextEntry] : Discussed with pt at length regarding COPD, PNDS, h/o lung cancer, pulmonary nodules, soobe; reviewed w/u with pt as above.

## 2024-09-18 NOTE — DISCUSSION/SUMMARY
[FreeTextEntry1] : #1. The patient's COPD appears to be at relative baseline clinically though reports some increase in SOBOE and cough despite her Spiriva daily. Changed to Anoro daily given increased symptoms and worsening PFTS. #2. She will continue her Nasonex and for her postnasal drip. She is no longer on Montelukast. #3. I have also recommended exercise to improve her exercise tolerance. #4. S/p resection of nodule c/w adenoCa.  #5. Oncology f/u for h/o of breast Ca and lung Ca; she is followed by Dr. Redman for anemia. #6. Continue hematology w/u for chronic anemia as required. #7. Prior chest CT revealed essentially stable nodules with slight increase in one nodule to 3 mm in size but then repeat CT with stable nodules. Now f/u chest CT with enlarged R hilar mass and small lymph node with uptake on PET CT. Pt seen by thoracic surgery and now to see interventional pulmonology for bx and subsequent oncology evaluation for suspected recurrent lung cancer. #8. Prednisone taper for cough and URI symptoms as needed. #9. Pt had both Covid vaccines, booster, and flu vaccines. #10. Zyrtec/Allegra/Claritin for allergies and Flonase nasal spray for postnasal drip as needed. #11. F/u in 3 months once pt has bx and oncology w/u.  The patient expressed understanding and agreement with the above recommendations/plan and accepts responsibility to be compliant with recommended testing, therapies, and f/u visits. All relevant questions and concerns were addressed.  Discussed above with patient and granddaughter who was also present. D/w Dr. Cunningham on phone.

## 2024-09-18 NOTE — HISTORY OF PRESENT ILLNESS
[Doing Well] : doing well [Difficulty Breathing During Exertion] : denies dyspnea on exertion [Feelings Of Weakness On Exertion] : denies exercise intolerance [Cough] : stable coughing [Coughing Up Sputum] : denies coughing up sputum [Wheezing] : denies wheezing [Regional Soft Tissue Swelling Both Lower Extremities] : denies lower extremity edema [None] : None [Adherent] : the patient is adherent with ~his/her~ medication regimen [Goals--Doing Well] : the patient is doing well with ~his/her~ COPD goals [CT Scan] : a CT scan [On ___] : performed on [unfilled] [Patient] : the patient [Indication ___] : for an indication of [unfilled] [TextBox_4] : Former smoker of 1 ppd x 25 years, quit 1990   The patient had a h/o a new nodule which was thought to be inflammatory or infectious and for which she had a course of Abx. PET CT was performed but was negative. 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 She underwent VATS and resection 7/2014. She has recovered well without new complaints. She has residual nodules which have been stable on her chest CT but ? increase in one 3 mm nodule. She has been doing well with Flonase for PNDS Pt follows with Dr. Redman of oncology. Pt with recent URI/sinusitis but swab was negative. Residual nasal congestion and cough.  [FreeTextEntry5] : Chest CT [FreeTextEntry8] : Essentially stable nodules except for 3 new or more conspicuous nodules [TextEntry] : Chest CT from 11/28/17 revealed essentially stable or improved nodules c/w her previous. Chest CT from 5/21/18 revealed essentially stable nodules with ? new 2 mm RUBIA nodule  Chest CT from 1/9/19 revealed near stable nodules with ? minimal increase but with 2 new nodules measuring 5 and 6 mm Chest CT from 4/10/19 revealed essentially stable nodules with one new 3 mm RLL nodule  Chest CT from 11/18/19 revealed essentially stable nodules including the recent new 3 mm nodule  Chest CT from 12/2/20 revealed stable nodules Chest CT from 2/9/22 revealed stable nodules  Chest CT from 3/17/23 revealed essentially stable nodules with ? increase in one nodule to 3 mm in size. Chest CT from 9/12/23 revealed stable nodules c/w previous. Chest CT from 8/14/24 with increase in size of R perihilar opacity suspicious for malignancy but otherwise stable nodules. PET CT from 8/22/24 with increased uptake in R hilar opacitiy and R hilar NOAH and small R effusion.

## 2024-12-03 ENCOUNTER — APPOINTMENT (OUTPATIENT)
Dept: PULMONOLOGY | Facility: CLINIC | Age: 88
End: 2024-12-03
Payer: MEDICARE

## 2024-12-03 VITALS — WEIGHT: 99 LBS | BODY MASS INDEX: 18.69 KG/M2 | HEIGHT: 61 IN

## 2024-12-03 VITALS
OXYGEN SATURATION: 97 % | DIASTOLIC BLOOD PRESSURE: 60 MMHG | HEART RATE: 74 BPM | SYSTOLIC BLOOD PRESSURE: 132 MMHG | RESPIRATION RATE: 16 BRPM

## 2024-12-03 DIAGNOSIS — Z87.891 PERSONAL HISTORY OF NICOTINE DEPENDENCE: ICD-10-CM

## 2024-12-03 DIAGNOSIS — R91.8 OTHER NONSPECIFIC ABNORMAL FINDING OF LUNG FIELD: ICD-10-CM

## 2024-12-03 DIAGNOSIS — J44.9 CHRONIC OBSTRUCTIVE PULMONARY DISEASE, UNSPECIFIED: ICD-10-CM

## 2024-12-03 DIAGNOSIS — R93.89 ABNORMAL FINDINGS ON DIAGNOSTIC IMAGING OF OTHER SPECIFIED BODY STRUCTURES: ICD-10-CM

## 2024-12-03 DIAGNOSIS — R06.02 SHORTNESS OF BREATH: ICD-10-CM

## 2024-12-03 DIAGNOSIS — C34.01 MALIGNANT NEOPLASM OF RIGHT MAIN BRONCHUS: ICD-10-CM

## 2024-12-03 DIAGNOSIS — R09.82 POSTNASAL DRIP: ICD-10-CM

## 2024-12-03 PROCEDURE — 99214 OFFICE O/P EST MOD 30 MIN: CPT

## 2024-12-03 PROCEDURE — G2211 COMPLEX E/M VISIT ADD ON: CPT

## 2025-02-20 ENCOUNTER — OFFICE (OUTPATIENT)
Dept: URBAN - METROPOLITAN AREA CLINIC 104 | Facility: CLINIC | Age: OVER 89
Setting detail: OPHTHALMOLOGY
End: 2025-02-20
Payer: MEDICARE

## 2025-02-20 DIAGNOSIS — H01.004: ICD-10-CM

## 2025-02-20 DIAGNOSIS — E11.9: ICD-10-CM

## 2025-02-20 DIAGNOSIS — H16.222: ICD-10-CM

## 2025-02-20 DIAGNOSIS — Z79.84: ICD-10-CM

## 2025-02-20 DIAGNOSIS — H01.002: ICD-10-CM

## 2025-02-20 DIAGNOSIS — Z96.1: ICD-10-CM

## 2025-02-20 DIAGNOSIS — H01.005: ICD-10-CM

## 2025-02-20 DIAGNOSIS — H01.001: ICD-10-CM

## 2025-02-20 DIAGNOSIS — H18.513: ICD-10-CM

## 2025-02-20 DIAGNOSIS — H43.813: ICD-10-CM

## 2025-02-20 PROCEDURE — 92014 COMPRE OPH EXAM EST PT 1/>: CPT | Performed by: SPECIALIST

## 2025-02-20 ASSESSMENT — TONOMETRY
OD_IOP_MMHG: 17
OS_IOP_MMHG: 17

## 2025-02-20 ASSESSMENT — SUPERFICIAL PUNCTATE KERATITIS (SPK): OS_SPK: 2+

## 2025-02-20 ASSESSMENT — REFRACTION_MANIFEST
OD_AXIS: 095
OS_SPHERE: +1.75
OS_AXIS: 105
OU_VA: 20/40
OD_VA1: 20/40
OD_SPHERE: +2.00
OS_VA1: 20/50
OS_CYLINDER: -2.00
OD_CYLINDER: -2.50

## 2025-02-20 ASSESSMENT — KERATOMETRY
OS_K1POWER_DIOPTERS: 42.13
OD_K2POWER_DIOPTERS: 44.41
OS_AXISANGLE_DEGREES: 006
OS_K2POWER_DIOPTERS: 44.23
OD_AXISANGLE_DEGREES: 174
OD_K1POWER_DIOPTERS: 41.82

## 2025-02-20 ASSESSMENT — REFRACTION_AUTOREFRACTION
OS_AXIS: 100
OS_SPHERE: +2.50
OS_CYLINDER: -2.50
OD_CYLINDER: -3.50
OD_AXIS: 083
OD_SPHERE: +3.00

## 2025-02-20 ASSESSMENT — VISUAL ACUITY
OS_BCVA: 20/50
OD_BCVA: 20/50

## 2025-02-20 ASSESSMENT — REFRACTION_CURRENTRX
OD_OVR_VA: 20/
OS_CYLINDER: -1.25
OS_OVR_VA: 20/
OD_AXIS: 095
OS_SPHERE: +1.50
OD_CYLINDER: -2.25
OD_SPHERE: +2.00
OS_AXIS: 103

## 2025-02-20 ASSESSMENT — LID EXAM ASSESSMENTS
OD_BLEPHARITIS: RLL RUL 2+
OS_BLEPHARITIS: LLL LUL 2+

## 2025-02-20 ASSESSMENT — TEAR BREAK UP TIME (TBUT)
OS_TBUT: 2+
OD_TBUT: 2+

## 2025-02-20 ASSESSMENT — CONFRONTATIONAL VISUAL FIELD TEST (CVF)
OS_FINDINGS: FULL
OD_FINDINGS: FULL

## 2025-02-20 ASSESSMENT — PUNCTA - ASSESSMENT: OD_PUNCTA: SCLEROSED

## 2025-03-27 ENCOUNTER — APPOINTMENT (OUTPATIENT)
Dept: PULMONOLOGY | Facility: CLINIC | Age: 89
End: 2025-03-27

## 2025-03-31 ENCOUNTER — RX RENEWAL (OUTPATIENT)
Age: 89
End: 2025-03-31

## 2025-04-21 ENCOUNTER — APPOINTMENT (OUTPATIENT)
Dept: PULMONOLOGY | Facility: CLINIC | Age: 89
End: 2025-04-21
Payer: MEDICARE

## 2025-04-21 VITALS — BODY MASS INDEX: 19.02 KG/M2 | WEIGHT: 106 LBS | HEIGHT: 62.75 IN

## 2025-04-21 VITALS
HEART RATE: 82 BPM | OXYGEN SATURATION: 96 % | SYSTOLIC BLOOD PRESSURE: 124 MMHG | RESPIRATION RATE: 16 BRPM | DIASTOLIC BLOOD PRESSURE: 78 MMHG

## 2025-04-21 DIAGNOSIS — R93.89 ABNORMAL FINDINGS ON DIAGNOSTIC IMAGING OF OTHER SPECIFIED BODY STRUCTURES: ICD-10-CM

## 2025-04-21 DIAGNOSIS — R09.82 POSTNASAL DRIP: ICD-10-CM

## 2025-04-21 DIAGNOSIS — Z87.891 PERSONAL HISTORY OF NICOTINE DEPENDENCE: ICD-10-CM

## 2025-04-21 DIAGNOSIS — R06.02 SHORTNESS OF BREATH: ICD-10-CM

## 2025-04-21 DIAGNOSIS — R91.8 OTHER NONSPECIFIC ABNORMAL FINDING OF LUNG FIELD: ICD-10-CM

## 2025-04-21 DIAGNOSIS — C34.01 MALIGNANT NEOPLASM OF RIGHT MAIN BRONCHUS: ICD-10-CM

## 2025-04-21 DIAGNOSIS — J44.9 CHRONIC OBSTRUCTIVE PULMONARY DISEASE, UNSPECIFIED: ICD-10-CM

## 2025-04-21 PROCEDURE — 99215 OFFICE O/P EST HI 40 MIN: CPT

## 2025-04-21 PROCEDURE — G2211 COMPLEX E/M VISIT ADD ON: CPT

## 2025-04-21 RX ORDER — PREDNISONE 10 MG/1
10 TABLET ORAL
Qty: 50 | Refills: 0 | Status: ACTIVE | COMMUNITY
Start: 2025-04-21 | End: 1900-01-01

## 2025-04-21 RX ORDER — ALBUTEROL SULFATE 90 UG/1
108 (90 BASE) INHALANT RESPIRATORY (INHALATION)
Qty: 3 | Refills: 3 | Status: ACTIVE | COMMUNITY
Start: 2025-04-21 | End: 1900-01-01

## 2025-04-21 RX ORDER — FLUTICASONE FUROATE, UMECLIDINIUM BROMIDE AND VILANTEROL TRIFENATATE 200; 62.5; 25 UG/1; UG/1; UG/1
200-62.5-25 POWDER RESPIRATORY (INHALATION) DAILY
Qty: 3 | Refills: 3 | Status: ACTIVE | COMMUNITY
Start: 2025-04-21 | End: 1900-01-01

## 2025-04-21 RX ORDER — ALBUTEROL SULFATE 2.5 MG/3ML
(2.5 MG/3ML) SOLUTION RESPIRATORY (INHALATION)
Qty: 1 | Refills: 2 | Status: ACTIVE | COMMUNITY
Start: 2025-04-21 | End: 1900-01-01

## 2025-06-09 ENCOUNTER — APPOINTMENT (OUTPATIENT)
Dept: PULMONOLOGY | Facility: CLINIC | Age: 89
End: 2025-06-09

## 2025-07-11 ENCOUNTER — APPOINTMENT (OUTPATIENT)
Dept: PULMONOLOGY | Facility: CLINIC | Age: 89
End: 2025-07-11

## 2025-07-11 VITALS
RESPIRATION RATE: 16 BRPM | SYSTOLIC BLOOD PRESSURE: 120 MMHG | HEART RATE: 69 BPM | WEIGHT: 89 LBS | DIASTOLIC BLOOD PRESSURE: 60 MMHG | BODY MASS INDEX: 15.97 KG/M2 | HEIGHT: 62.75 IN | OXYGEN SATURATION: 94 %

## 2025-07-11 PROCEDURE — G2211 COMPLEX E/M VISIT ADD ON: CPT

## 2025-07-11 PROCEDURE — 99215 OFFICE O/P EST HI 40 MIN: CPT

## 2025-07-11 RX ORDER — DILTIAZEM HYDROCHLORIDE 60 MG/1
TABLET, COATED ORAL
Refills: 0 | Status: ACTIVE | COMMUNITY

## 2025-07-11 RX ORDER — APIXABAN 5 MG/1
TABLET, FILM COATED ORAL
Refills: 0 | Status: ACTIVE | COMMUNITY

## 2025-07-11 RX ORDER — ENSIFENTRINE 3 MG/2.5ML
3 SUSPENSION RESPIRATORY (INHALATION)
Qty: 60 | Refills: 5 | Status: ACTIVE | COMMUNITY
Start: 2025-07-11 | End: 1900-01-01

## 2025-08-15 ENCOUNTER — APPOINTMENT (OUTPATIENT)
Dept: PULMONOLOGY | Facility: CLINIC | Age: 89
End: 2025-08-15